# Patient Record
Sex: MALE | Race: WHITE | NOT HISPANIC OR LATINO | Employment: FULL TIME | URBAN - NONMETROPOLITAN AREA
[De-identification: names, ages, dates, MRNs, and addresses within clinical notes are randomized per-mention and may not be internally consistent; named-entity substitution may affect disease eponyms.]

---

## 2017-03-31 ENCOUNTER — HOSPITAL ENCOUNTER (OUTPATIENT)
Facility: HOSPITAL | Age: 62
Setting detail: OBSERVATION
Discharge: HOME | End: 2017-04-01
Attending: HOSPITALIST | Admitting: HOSPITALIST

## 2017-03-31 ENCOUNTER — APPOINTMENT (OUTPATIENT)
Dept: GENERAL RADIOLOGY | Facility: HOSPITAL | Age: 62
End: 2017-03-31

## 2017-03-31 DIAGNOSIS — T78.3XXA ALLERGIC ANGIOEDEMA, INITIAL ENCOUNTER: Primary | ICD-10-CM

## 2017-03-31 LAB
ALBUMIN SERPL-MCNC: 4.2 G/DL (ref 3.4–4.8)
ALBUMIN/GLOB SERPL: 1.4 G/DL (ref 1.1–1.8)
ALP SERPL-CCNC: 74 U/L (ref 38–126)
ALT SERPL W P-5'-P-CCNC: 40 U/L (ref 21–72)
ANION GAP SERPL CALCULATED.3IONS-SCNC: 14 MMOL/L (ref 5–15)
AST SERPL-CCNC: 36 U/L (ref 17–59)
BILIRUB SERPL-MCNC: 0.7 MG/DL (ref 0.2–1.3)
BUN BLD-MCNC: 21 MG/DL (ref 7–21)
BUN/CREAT SERPL: 23.9 (ref 7–25)
CALCIUM SPEC-SCNC: 9.5 MG/DL (ref 8.4–10.2)
CHLORIDE SERPL-SCNC: 105 MMOL/L (ref 95–110)
CO2 SERPL-SCNC: 21 MMOL/L (ref 22–31)
CREAT BLD-MCNC: 0.88 MG/DL (ref 0.7–1.3)
DEPRECATED RDW RBC AUTO: 41.3 FL (ref 35.1–43.9)
ERYTHROCYTE [DISTWIDTH] IN BLOOD BY AUTOMATED COUNT: 12.5 % (ref 11.5–14.5)
GFR SERPL CREATININE-BSD FRML MDRD: 89 ML/MIN/1.73 (ref 56–130)
GLOBULIN UR ELPH-MCNC: 3.1 GM/DL (ref 2.3–3.5)
GLUCOSE BLD-MCNC: 166 MG/DL (ref 60–100)
HCT VFR BLD AUTO: 36.2 % (ref 39–49)
HGB BLD-MCNC: 12.9 G/DL (ref 13.7–17.3)
MCH RBC QN AUTO: 32.3 PG (ref 26.5–34)
MCHC RBC AUTO-ENTMCNC: 35.6 G/DL (ref 31.5–36.3)
MCV RBC AUTO: 90.5 FL (ref 80–98)
PLATELET # BLD AUTO: 400 10*3/MM3 (ref 150–450)
PMV BLD AUTO: 8.7 FL (ref 8–12)
POTASSIUM BLD-SCNC: 4.1 MMOL/L (ref 3.5–5.1)
PROT SERPL-MCNC: 7.3 G/DL (ref 6.3–8.6)
RBC # BLD AUTO: 4 10*6/MM3 (ref 4.37–5.74)
SODIUM BLD-SCNC: 140 MMOL/L (ref 137–145)
WBC NRBC COR # BLD: 10.13 10*3/MM3 (ref 3.2–9.8)

## 2017-03-31 PROCEDURE — 25010000002 DIPHENHYDRAMINE PER 50 MG: Performed by: EMERGENCY MEDICINE

## 2017-03-31 PROCEDURE — 25010000002 EPINEPHRINE PER 0.1 MG: Performed by: EMERGENCY MEDICINE

## 2017-03-31 PROCEDURE — 80053 COMPREHEN METABOLIC PANEL: CPT | Performed by: HOSPITALIST

## 2017-03-31 PROCEDURE — G0378 HOSPITAL OBSERVATION PER HR: HCPCS

## 2017-03-31 PROCEDURE — 25010000002 METHYLPREDNISOLONE PER 125 MG: Performed by: HOSPITALIST

## 2017-03-31 PROCEDURE — 85027 COMPLETE CBC AUTOMATED: CPT | Performed by: HOSPITALIST

## 2017-03-31 PROCEDURE — 25010000002 METHYLPREDNISOLONE PER 125 MG: Performed by: EMERGENCY MEDICINE

## 2017-03-31 PROCEDURE — 96376 TX/PRO/DX INJ SAME DRUG ADON: CPT

## 2017-03-31 PROCEDURE — 96372 THER/PROPH/DIAG INJ SC/IM: CPT

## 2017-03-31 PROCEDURE — 25010000002 DIPHENHYDRAMINE PER 50 MG: Performed by: PHYSICIAN ASSISTANT

## 2017-03-31 PROCEDURE — 71020 HC CHEST PA AND LATERAL: CPT

## 2017-03-31 PROCEDURE — 99284 EMERGENCY DEPT VISIT MOD MDM: CPT

## 2017-03-31 PROCEDURE — 96374 THER/PROPH/DIAG INJ IV PUSH: CPT

## 2017-03-31 RX ORDER — TRAMADOL HYDROCHLORIDE 50 MG/1
50 TABLET ORAL EVERY 6 HOURS PRN
Status: DISCONTINUED | OUTPATIENT
Start: 2017-03-31 | End: 2017-04-01 | Stop reason: HOSPADM

## 2017-03-31 RX ORDER — CLOPIDOGREL BISULFATE 75 MG/1
75 TABLET ORAL DAILY
COMMUNITY
Start: 2016-05-25 | End: 2017-04-01 | Stop reason: HOSPADM

## 2017-03-31 RX ORDER — DIAZEPAM 5 MG/1
5 TABLET ORAL 2 TIMES DAILY PRN
COMMUNITY
End: 2018-08-07

## 2017-03-31 RX ORDER — SODIUM CHLORIDE 0.9 % (FLUSH) 0.9 %
1-10 SYRINGE (ML) INJECTION AS NEEDED
Status: DISCONTINUED | OUTPATIENT
Start: 2017-03-31 | End: 2017-04-01 | Stop reason: HOSPADM

## 2017-03-31 RX ORDER — PRAVASTATIN SODIUM 40 MG
40 TABLET ORAL DAILY
COMMUNITY
End: 2018-08-07

## 2017-03-31 RX ORDER — PRAVASTATIN SODIUM 40 MG
40 TABLET ORAL NIGHTLY
Status: DISCONTINUED | OUTPATIENT
Start: 2017-03-31 | End: 2017-04-01 | Stop reason: HOSPADM

## 2017-03-31 RX ORDER — CLOPIDOGREL BISULFATE 75 MG/1
75 TABLET ORAL DAILY
Status: DISCONTINUED | OUTPATIENT
Start: 2017-03-31 | End: 2017-04-01 | Stop reason: HOSPADM

## 2017-03-31 RX ORDER — MORPHINE SULFATE 2 MG/ML
1 INJECTION, SOLUTION INTRAMUSCULAR; INTRAVENOUS EVERY 4 HOURS PRN
Status: DISCONTINUED | OUTPATIENT
Start: 2017-03-31 | End: 2017-03-31

## 2017-03-31 RX ORDER — CETIRIZINE HYDROCHLORIDE 10 MG/1
10 TABLET ORAL DAILY
Status: DISCONTINUED | OUTPATIENT
Start: 2017-03-31 | End: 2017-04-01 | Stop reason: HOSPADM

## 2017-03-31 RX ORDER — DIPHENHYDRAMINE HYDROCHLORIDE 50 MG/ML
50 INJECTION INTRAMUSCULAR; INTRAVENOUS ONCE
Status: COMPLETED | OUTPATIENT
Start: 2017-03-31 | End: 2017-03-31

## 2017-03-31 RX ORDER — METHYLPREDNISOLONE SODIUM SUCCINATE 125 MG/2ML
125 INJECTION, POWDER, LYOPHILIZED, FOR SOLUTION INTRAMUSCULAR; INTRAVENOUS ONCE
Status: COMPLETED | OUTPATIENT
Start: 2017-03-31 | End: 2017-03-31

## 2017-03-31 RX ORDER — METHYLPREDNISOLONE SODIUM SUCCINATE 125 MG/2ML
80 INJECTION, POWDER, LYOPHILIZED, FOR SOLUTION INTRAMUSCULAR; INTRAVENOUS EVERY 8 HOURS
Status: DISCONTINUED | OUTPATIENT
Start: 2017-03-31 | End: 2017-04-01 | Stop reason: HOSPADM

## 2017-03-31 RX ORDER — LISINOPRIL 5 MG/1
5 TABLET ORAL DAILY
COMMUNITY
End: 2017-04-01 | Stop reason: HOSPADM

## 2017-03-31 RX ORDER — CLOPIDOGREL BISULFATE 75 MG/1
75 TABLET ORAL NIGHTLY
COMMUNITY

## 2017-03-31 RX ORDER — ROPINIROLE 1 MG/1
1 TABLET, FILM COATED ORAL DAILY
COMMUNITY
Start: 2017-02-07 | End: 2017-08-14

## 2017-03-31 RX ORDER — DIPHENHYDRAMINE HYDROCHLORIDE 50 MG/ML
25 INJECTION INTRAMUSCULAR; INTRAVENOUS EVERY 6 HOURS PRN
Status: DISCONTINUED | OUTPATIENT
Start: 2017-03-31 | End: 2017-04-01 | Stop reason: HOSPADM

## 2017-03-31 RX ORDER — PRAVASTATIN SODIUM 40 MG
40 TABLET ORAL DAILY
COMMUNITY
Start: 2016-06-08 | End: 2017-04-01 | Stop reason: HOSPADM

## 2017-03-31 RX ORDER — LORAZEPAM 2 MG/ML
0.5 INJECTION INTRAMUSCULAR EVERY 6 HOURS PRN
Status: DISCONTINUED | OUTPATIENT
Start: 2017-03-31 | End: 2017-04-01 | Stop reason: HOSPADM

## 2017-03-31 RX ORDER — TRAMADOL HYDROCHLORIDE 50 MG/1
50 TABLET ORAL EVERY 4 HOURS
COMMUNITY
Start: 2017-03-21 | End: 2017-04-01 | Stop reason: HOSPADM

## 2017-03-31 RX ORDER — SODIUM CHLORIDE 9 MG/ML
75 INJECTION, SOLUTION INTRAVENOUS CONTINUOUS
Status: DISCONTINUED | OUTPATIENT
Start: 2017-03-31 | End: 2017-04-01 | Stop reason: HOSPADM

## 2017-03-31 RX ORDER — FAMOTIDINE 40 MG/1
40 TABLET, FILM COATED ORAL DAILY
Status: DISCONTINUED | OUTPATIENT
Start: 2017-03-31 | End: 2017-04-01 | Stop reason: HOSPADM

## 2017-03-31 RX ORDER — RANITIDINE 150 MG/1
150 TABLET ORAL 2 TIMES DAILY
COMMUNITY
Start: 2016-08-25 | End: 2017-04-01 | Stop reason: HOSPADM

## 2017-03-31 RX ORDER — LISINOPRIL AND HYDROCHLOROTHIAZIDE 12.5; 1 MG/1; MG/1
1 TABLET ORAL
COMMUNITY
Start: 2017-01-24 | End: 2017-04-01 | Stop reason: HOSPADM

## 2017-03-31 RX ORDER — NALOXONE HCL 0.4 MG/ML
0.4 VIAL (ML) INJECTION
Status: DISCONTINUED | OUTPATIENT
Start: 2017-03-31 | End: 2017-03-31

## 2017-03-31 RX ORDER — TRAMADOL HYDROCHLORIDE 50 MG/1
50 TABLET ORAL EVERY 6 HOURS PRN
COMMUNITY
End: 2018-08-07

## 2017-03-31 RX ORDER — NALOXONE HYDROCHLORIDE 0.4 MG/ML
0.4 INJECTION, SOLUTION INTRAMUSCULAR; INTRAVENOUS; SUBCUTANEOUS
Status: DISCONTINUED | OUTPATIENT
Start: 2017-03-31 | End: 2017-03-31

## 2017-03-31 RX ORDER — RANITIDINE 150 MG/1
150 TABLET ORAL 2 TIMES DAILY
COMMUNITY

## 2017-03-31 RX ADMIN — CLOPIDOGREL BISULFATE 75 MG: 75 TABLET ORAL at 09:15

## 2017-03-31 RX ADMIN — SODIUM CHLORIDE 75 ML/HR: 9 INJECTION, SOLUTION INTRAVENOUS at 09:16

## 2017-03-31 RX ADMIN — DIPHENHYDRAMINE HYDROCHLORIDE 50 MG: 50 INJECTION INTRAMUSCULAR; INTRAVENOUS at 04:23

## 2017-03-31 RX ADMIN — PRAVASTATIN SODIUM 40 MG: 40 TABLET ORAL at 20:28

## 2017-03-31 RX ADMIN — METHYLPREDNISOLONE SODIUM SUCCINATE 80 MG: 125 INJECTION, POWDER, FOR SOLUTION INTRAMUSCULAR; INTRAVENOUS at 09:15

## 2017-03-31 RX ADMIN — CETIRIZINE HYDROCHLORIDE 10 MG: 10 TABLET, FILM COATED ORAL at 15:01

## 2017-03-31 RX ADMIN — METHYLPREDNISOLONE SODIUM SUCCINATE 125 MG: 125 INJECTION, POWDER, FOR SOLUTION INTRAMUSCULAR; INTRAVENOUS at 04:23

## 2017-03-31 RX ADMIN — TRAMADOL HYDROCHLORIDE 50 MG: 50 TABLET, FILM COATED ORAL at 20:28

## 2017-03-31 RX ADMIN — FAMOTIDINE 40 MG: 40 TABLET ORAL at 04:36

## 2017-03-31 RX ADMIN — METHYLPREDNISOLONE SODIUM SUCCINATE 80 MG: 125 INJECTION, POWDER, FOR SOLUTION INTRAMUSCULAR; INTRAVENOUS at 23:37

## 2017-03-31 RX ADMIN — DIPHENHYDRAMINE HYDROCHLORIDE 25 MG: 50 INJECTION INTRAMUSCULAR; INTRAVENOUS at 12:55

## 2017-03-31 RX ADMIN — TRAMADOL HYDROCHLORIDE 50 MG: 50 TABLET, FILM COATED ORAL at 14:33

## 2017-03-31 RX ADMIN — EPINEPHRINE 0.3 MG: 1 INJECTION, SOLUTION INTRAMUSCULAR; SUBCUTANEOUS at 05:14

## 2017-03-31 RX ADMIN — EPINEPHRINE 0.3 MG: 1 INJECTION, SOLUTION INTRAMUSCULAR; SUBCUTANEOUS at 04:28

## 2017-03-31 RX ADMIN — METHYLPREDNISOLONE SODIUM SUCCINATE 80 MG: 125 INJECTION, POWDER, FOR SOLUTION INTRAMUSCULAR; INTRAVENOUS at 16:54

## 2017-03-31 RX ADMIN — FAMOTIDINE 40 MG: 40 TABLET ORAL at 08:27

## 2017-04-01 VITALS
RESPIRATION RATE: 18 BRPM | OXYGEN SATURATION: 98 % | WEIGHT: 195.19 LBS | TEMPERATURE: 96.1 F | SYSTOLIC BLOOD PRESSURE: 134 MMHG | HEIGHT: 76 IN | HEART RATE: 93 BPM | DIASTOLIC BLOOD PRESSURE: 76 MMHG | BODY MASS INDEX: 23.77 KG/M2

## 2017-04-01 LAB
ANION GAP SERPL CALCULATED.3IONS-SCNC: 11 MMOL/L (ref 5–15)
BUN BLD-MCNC: 23 MG/DL (ref 7–21)
BUN/CREAT SERPL: 28.8 (ref 7–25)
CALCIUM SPEC-SCNC: 9.2 MG/DL (ref 8.4–10.2)
CHLORIDE SERPL-SCNC: 103 MMOL/L (ref 95–110)
CO2 SERPL-SCNC: 24 MMOL/L (ref 22–31)
CREAT BLD-MCNC: 0.8 MG/DL (ref 0.7–1.3)
DEPRECATED RDW RBC AUTO: 42.2 FL (ref 35.1–43.9)
ERYTHROCYTE [DISTWIDTH] IN BLOOD BY AUTOMATED COUNT: 12.6 % (ref 11.5–14.5)
GFR SERPL CREATININE-BSD FRML MDRD: 99 ML/MIN/1.73 (ref 60–130)
GLUCOSE BLD-MCNC: 126 MG/DL (ref 60–100)
HCT VFR BLD AUTO: 36.2 % (ref 39–49)
HGB BLD-MCNC: 12.9 G/DL (ref 13.7–17.3)
MCH RBC QN AUTO: 32.5 PG (ref 26.5–34)
MCHC RBC AUTO-ENTMCNC: 35.6 G/DL (ref 31.5–36.3)
MCV RBC AUTO: 91.2 FL (ref 80–98)
PLATELET # BLD AUTO: 406 10*3/MM3 (ref 150–450)
PMV BLD AUTO: 9.3 FL (ref 8–12)
POTASSIUM BLD-SCNC: 4.2 MMOL/L (ref 3.5–5.1)
RBC # BLD AUTO: 3.97 10*6/MM3 (ref 4.37–5.74)
SODIUM BLD-SCNC: 138 MMOL/L (ref 137–145)
WBC NRBC COR # BLD: 20.12 10*3/MM3 (ref 3.2–9.8)

## 2017-04-01 PROCEDURE — G0378 HOSPITAL OBSERVATION PER HR: HCPCS

## 2017-04-01 PROCEDURE — 25010000002 METHYLPREDNISOLONE PER 125 MG: Performed by: HOSPITALIST

## 2017-04-01 PROCEDURE — 96376 TX/PRO/DX INJ SAME DRUG ADON: CPT

## 2017-04-01 PROCEDURE — 85027 COMPLETE CBC AUTOMATED: CPT | Performed by: NURSE PRACTITIONER

## 2017-04-01 PROCEDURE — 80048 BASIC METABOLIC PNL TOTAL CA: CPT | Performed by: NURSE PRACTITIONER

## 2017-04-01 RX ORDER — METHYLPREDNISOLONE 4 MG/1
TABLET ORAL
Qty: 21 TABLET | Refills: 0 | Status: SHIPPED | OUTPATIENT
Start: 2017-04-01 | End: 2017-06-14 | Stop reason: ALTCHOICE

## 2017-04-01 RX ORDER — EPINEPHRINE 0.3 MG/.3ML
0.3 INJECTION SUBCUTANEOUS AS NEEDED
Qty: 1 EACH | Refills: 1 | Status: SHIPPED | OUTPATIENT
Start: 2017-04-01

## 2017-04-01 RX ADMIN — TRAMADOL HYDROCHLORIDE 50 MG: 50 TABLET, FILM COATED ORAL at 08:14

## 2017-04-01 RX ADMIN — CLOPIDOGREL BISULFATE 75 MG: 75 TABLET ORAL at 08:08

## 2017-04-01 RX ADMIN — CETIRIZINE HYDROCHLORIDE 10 MG: 10 TABLET, FILM COATED ORAL at 08:08

## 2017-04-01 RX ADMIN — FAMOTIDINE 40 MG: 40 TABLET ORAL at 08:08

## 2017-04-01 RX ADMIN — METHYLPREDNISOLONE SODIUM SUCCINATE 80 MG: 125 INJECTION, POWDER, FOR SOLUTION INTRAMUSCULAR; INTRAVENOUS at 08:09

## 2017-04-01 NOTE — DISCHARGE SUMMARY
[facility] Medicine Services  DISCHARGE SUMMARY       Date of Admission: 3/31/2017  Date of Discharge:  4/1/2017  Primary Care Physician: Yasmani Alberto MD    Presenting Problem/History of Present Illness:  Allergic angioedema, initial encounter [T78.3XXA]       Final Discharge Diagnoses:  Hospital Problem List     Allergic angioedema          Consults:   Consults     Date and Time Order Name Status Description    3/31/2017 0527 Hospitalist (on-call MD unless specified)            Procedures Performed:                 Pertinent Test Results:   Lab Results (last 24 hours)     Procedure Component Value Units Date/Time    CBC (No Diff) [[id number]] Collected:  04/01/17 1008    Specimen:  Blood Updated:  04/01/17 1050    Basic Metabolic Panel [[id number]]  (Abnormal) Collected:  04/01/17 1008    Specimen:  Blood Updated:  04/01/17 1110     Glucose 126 (H) mg/dL      BUN 23 (H) mg/dL      Creatinine 0.80 mg/dL      Sodium 138 mmol/L      Potassium 4.2 mmol/L      Chloride 103 mmol/L      CO2 24.0 mmol/L      Calcium 9.2 mg/dL      eGFR Non African Amer 99 mL/min/1.73      BUN/Creatinine Ratio 28.8 (H)     Anion Gap 11.0 mmol/L         Imaging Results (last 24 hours)     Procedure Component Value Units Date/Time    XR Chest PA & Lateral [[id number]] Collected:  03/31/17 1446     Updated:  03/31/17 1456    Narrative:         [facility] [place]    Patient Name: KRISTOPHER SMITH    ORDERING: SHEILA HAHN     ATTENDING: ZEUS WELLS     REFERRING: SHEILA HAHN    -----------------------    PROCEDURE: Two-view chest    COMPARISON: 7/8/2013    HISTORY: Angioedema, survey airway, T78.3XXA Angioneurotic edema,  initial encounter    FINDINGS: Frontal and lateral views of the chest are obtained.     Devices: None    Lungs/Pleura: The lungs appear hyperinflated and there are coarse  interstitial markings likely due to COPD. Calcified nodule in the  right lung base.  The lungs  "are otherwise clear.    Cardiomediastinal structures: Normal         Impression:       CONCLUSION:    No acute cardiopulmonary disease    Electronically signed by:  Drew Darnell MD  3/31/2017 2:54 PM CDT  Workstation: [id number]            Chief Complaint on Day of Discharge: none    Hospital Course:  61-year-old  male who presented to the emergency department on March 31, 2017 with complaints of shortness of breath and facial swelling.  The patient reports that the shortness of breath and left-sided facial swelling started roughly 2 hours prior to his arrival in the ER.  Upon initial assessment it was found that patient had angioedema related to use of lisinopril.  He was treated during the hospitalization with IV steroids, Benadryl, and epinephrine.      During the hospitalization the patient's lisinopril was held and IV steroids were continued.  His facial swelling is greatly improved.  The patient has no airway compromise and he is able to perform all activities without any distress.  The patient will be given prescription at discharge for EpiPen and Medrol dose pack.  He'll be discharged home today in stable condition with instructions to follow-up with his primary care provider within a week of discharge in order to further monitor his hypertension as the lisinopril has been discontinued.    Condition on Discharge:  stable    Physical Exam on Discharge:  /76 (BP Location: Right arm, Patient Position: Sitting)  Pulse 93  Temp 96.1 °F (35.6 °C) (Oral)   Resp 18  Ht 76\" (193 cm)  Wt 195 lb 3 oz (88.5 kg)  SpO2 98%  BMI 23.76 kg/m2  Physical Exam   Constitutional: He is oriented to person, place, and time. He appears well-developed and well-nourished.   HENT:   Head: Normocephalic.   Eyes: Conjunctivae are normal.   Neck: Neck supple.   Cardiovascular: Normal rate, regular rhythm, normal heart sounds and intact distal pulses.  Exam reveals no gallop and no friction rub.    No murmur " heard.  Pulmonary/Chest: Effort normal and breath sounds normal. No respiratory distress. He has no wheezes. He has no rales. He exhibits no tenderness.   Abdominal: Soft. Bowel sounds are normal. He exhibits no distension. There is no tenderness.   Musculoskeletal: Normal range of motion.   Neurological: He is alert and oriented to person, place, and time.   Skin: Skin is warm and dry.   Psychiatric: He has a normal mood and affect. His behavior is normal.   Vitals reviewed.        Discharge Disposition:  Home or Self Care    Discharge Medications:   Heber Houston   Home Medication Instructions GILBERT:[id number]    Printed on:04/01/17 6326   Medication Information                      clopidogrel (PLAVIX) 75 MG tablet  Take 75 mg by mouth Daily.             diazePAM (VALIUM) 5 MG tablet  Take 5 mg by mouth 2 (Two) Times a Day As Needed for Anxiety.             MethylPREDNISolone (MEDROL, ELIDIA,) 4 MG tablet  Take as directed on package instructions.             pravastatin (PRAVACHOL) 40 MG tablet  Take 40 mg by mouth Daily.             raNITIdine (ZANTAC) 150 MG tablet  Take 150 mg by mouth 2 (Two) Times a Day.             rOPINIRole (REQUIP) 1 MG tablet  Take 1 mg by mouth Daily.             traMADol (ULTRAM) 50 MG tablet  Take 50 mg by mouth Every 6 (Six) Hours As Needed for Moderate Pain (4-6).                 Discharge Diet:   Diet Instructions     Diet: Cardiac; Thin Liquids, No Restrictions       Discharge Diet:  Cardiac   Fluid Consistency:  Thin Liquids, No Restrictions                 Activity at Discharge:   Activity Instructions     Activity as Tolerated                     Discharge Care Plan/Instructions: Follow up with PCP within one week of discharge.    Follow-up Appointments:   No future appointments.    Test Results Pending at Discharge:    Order Current Status    CBC (No Diff) In process          DANAE Cline  04/01/17  11:50 AM

## 2017-04-01 NOTE — PLAN OF CARE
Problem: Patient Care Overview (Adult)  Goal: Plan of Care Review  Outcome: Ongoing (interventions implemented as appropriate)    04/01/17 0525   Coping/Psychosocial Response Interventions   Plan Of Care Reviewed With patient   Patient Care Overview   Progress no change         Problem: Pain, Chronic (Adult)  Goal: Acceptable Pain Control/Comfort Level  Outcome: Ongoing (interventions implemented as appropriate)    Problem: Anaphylactic/Systemic Hypersensitivity Reaction (Adult)  Goal: Signs and Symptoms of Listed Potential Problems Will be Absent or Manageable (Anaphylactic/Systemic Hypersensitivity Reaction)  Outcome: Ongoing (interventions implemented as appropriate)

## 2017-06-14 RX ORDER — AMLODIPINE BESYLATE 5 MG/1
5 TABLET ORAL NIGHTLY
COMMUNITY

## 2017-06-19 ENCOUNTER — ANESTHESIA (OUTPATIENT)
Dept: GASTROENTEROLOGY | Facility: HOSPITAL | Age: 62
End: 2017-06-19

## 2017-06-19 ENCOUNTER — HOSPITAL ENCOUNTER (OUTPATIENT)
Facility: HOSPITAL | Age: 62
Setting detail: HOSPITAL OUTPATIENT SURGERY
Discharge: HOME | End: 2017-06-19
Attending: INTERNAL MEDICINE | Admitting: INTERNAL MEDICINE

## 2017-06-19 ENCOUNTER — ANESTHESIA EVENT (OUTPATIENT)
Dept: GASTROENTEROLOGY | Facility: HOSPITAL | Age: 62
End: 2017-06-19

## 2017-06-19 VITALS
SYSTOLIC BLOOD PRESSURE: 140 MMHG | OXYGEN SATURATION: 97 % | RESPIRATION RATE: 16 BRPM | BODY MASS INDEX: 23.99 KG/M2 | HEIGHT: 76 IN | TEMPERATURE: 97.4 F | WEIGHT: 197 LBS | HEART RATE: 66 BPM | DIASTOLIC BLOOD PRESSURE: 77 MMHG

## 2017-06-19 DIAGNOSIS — K21.9 GASTROESOPHAGEAL REFLUX DISEASE WITH STRICTURE: ICD-10-CM

## 2017-06-19 DIAGNOSIS — R10.11 ABDOMINAL PAIN, RUQ (RIGHT UPPER QUADRANT): ICD-10-CM

## 2017-06-19 DIAGNOSIS — K22.2 GASTROESOPHAGEAL REFLUX DISEASE WITH STRICTURE: ICD-10-CM

## 2017-06-19 PROCEDURE — 88305 TISSUE EXAM BY PATHOLOGIST: CPT | Performed by: INTERNAL MEDICINE

## 2017-06-19 PROCEDURE — 25010000002 PROPOFOL 10 MG/ML EMULSION: Performed by: NURSE ANESTHETIST, CERTIFIED REGISTERED

## 2017-06-19 PROCEDURE — 88305 TISSUE EXAM BY PATHOLOGIST: CPT | Performed by: PATHOLOGY

## 2017-06-19 RX ORDER — LIDOCAINE HYDROCHLORIDE 10 MG/ML
INJECTION, SOLUTION INFILTRATION; PERINEURAL AS NEEDED
Status: DISCONTINUED | OUTPATIENT
Start: 2017-06-19 | End: 2017-06-19 | Stop reason: SURG

## 2017-06-19 RX ORDER — PROPOFOL 10 MG/ML
VIAL (ML) INTRAVENOUS AS NEEDED
Status: DISCONTINUED | OUTPATIENT
Start: 2017-06-19 | End: 2017-06-19 | Stop reason: SURG

## 2017-06-19 RX ORDER — DEXTROSE AND SODIUM CHLORIDE 5; .45 G/100ML; G/100ML
20 INJECTION, SOLUTION INTRAVENOUS CONTINUOUS
Status: DISCONTINUED | OUTPATIENT
Start: 2017-06-19 | End: 2017-06-19 | Stop reason: HOSPADM

## 2017-06-19 RX ADMIN — PROPOFOL 30 MG: 10 INJECTION, EMULSION INTRAVENOUS at 17:15

## 2017-06-19 RX ADMIN — PROPOFOL 60 MG: 10 INJECTION, EMULSION INTRAVENOUS at 17:11

## 2017-06-19 RX ADMIN — LIDOCAINE HYDROCHLORIDE 50 MG: 10 INJECTION, SOLUTION INFILTRATION; PERINEURAL at 17:07

## 2017-06-19 RX ADMIN — PROPOFOL 80 MG: 10 INJECTION, EMULSION INTRAVENOUS at 17:08

## 2017-06-19 RX ADMIN — DEXTROSE AND SODIUM CHLORIDE 20 ML/HR: 5; 450 INJECTION, SOLUTION INTRAVENOUS at 16:11

## 2017-06-19 RX ADMIN — LIDOCAINE HYDROCHLORIDE 50 MG: 10 INJECTION, SOLUTION INFILTRATION; PERINEURAL at 16:59

## 2017-06-19 RX ADMIN — PROPOFOL 80 MG: 10 INJECTION, EMULSION INTRAVENOUS at 16:59

## 2017-06-19 NOTE — ANESTHESIA PROCEDURE NOTES
Peripheral IV    Patient location during procedure: OR  Line placed for Fluids/Medication Admin.  Performed By   CRNA: MADIE ARMENDARIZ  Preanesthetic Checklist  Completed: patient identified, site marked, surgical consent, pre-op evaluation, timeout performed, IV checked, risks and benefits discussed and monitors and equipment checked  Peripheral IV Prep   Patient position: supine   Prep: ChloraPrep  Patient monitoring: continuous pulse ox and cardiac monitor  Peripheral IV Procedure   Laterality:left  Location:  Forearm  Catheter size: 20 G         Post Assessment   Dressing Type: transparent and tape.    IV Dressing/Site: clean, dry and intact

## 2017-06-19 NOTE — PLAN OF CARE
Problem: GI Endoscopy (Adult)  Goal: Signs and Symptoms of Listed Potential Problems Will be Absent or Manageable (GI Endoscopy)  Outcome: Outcome(s) achieved Date Met:  06/19/17 06/19/17 1721   GI Endoscopy   Problems Assessed (GI Endoscopy) all   Problems Present (GI Endoscopy) none

## 2017-06-19 NOTE — H&P
Phillip Allison DO,[facility]  Gastroenterology  Hepatology  Endoscopy  Board Certified in Internal Medicine and gastroenterology  [address], [address]  [place]. [address]  T- [phone]   F - [phone]     GASTROENTEROLOGY HISTORY AND PHYSICAL  NOTE   PHILLIP ALLISON DO.         SUBJECTIVE:   6/19/2017    Name: Heber Houston  DOD: 1955      Chief Complaint:       Subjective : Dysphagia, weight loss of 5 pounds     Patient is 61 y.o. male presents with desire for elective EGD and dilation of the esophagus..      ROS/HISTORY/ CURRENT MEDICATIONS/OBJECTIVE/VS/PE:   Review of Systems:   Review of Systems    History:     Past Medical History:   Diagnosis Date   • Hypertension    • Stroke 2012    tia     Past Surgical History:   Procedure Laterality Date   • CAROTID ARTERY ANGIOPLASTY Left 2012   • CHOLECYSTECTOMY       History reviewed. No pertinent family history.  Social History   Substance Use Topics   • Smoking status: Current Every Day Smoker     Packs/day: 1.00     Types: Cigarettes   • Smokeless tobacco: None   • Alcohol use 3.0 oz/week     5 Cans of beer per week      Comment: 6 PK/WK     Prescriptions Prior to Admission   Medication Sig Dispense Refill Last Dose   • amLODIPine (NORVASC) 5 MG tablet Take 5 mg by mouth Daily.   6/18/2017 at Unknown time   • rOPINIRole (REQUIP) 1 MG tablet Take 1 mg by mouth Daily.   6/18/2017 at Unknown time   • traMADol (ULTRAM) 50 MG tablet Take 50 mg by mouth Every 6 (Six) Hours As Needed for Moderate Pain (4-6).   6/19/2017 at Unknown time   • clopidogrel (PLAVIX) 75 MG tablet Take 75 mg by mouth Daily.   6/15/2017   • diazePAM (VALIUM) 5 MG tablet Take 5 mg by mouth 2 (Two) Times a Day As Needed for Anxiety.   6/16/2017   • EPINEPHrine (EPIPEN 2-ELIDIA) 0.3 MG/0.3ML solution auto-injector injection Inject 0.3 mL under the skin As Needed (anaphylaxis). 1 each 1    • pravastatin (PRAVACHOL) 40 MG tablet Take 40 mg by mouth Daily.   6/16/2017   •  raNITIdine (ZANTAC) 150 MG tablet Take 150 mg by mouth 2 (Two) Times a Day.   More than a month at Unknown time     Allergies:  Lisinopril and Lisinopril-hydrochlorothiazide    I have reviewed the patients medical history, surgical history and family history in the available medical record system.     Current Medications:     Current Facility-Administered Medications   Medication Dose Route Frequency Provider Last Rate Last Dose   • dextrose 5 % and sodium chloride 0.45 % infusion  20 mL/hr Intravenous Continuous Sharan Gupta, DO 20 mL/hr at 06/19/17 1611 20 mL/hr at 06/19/17 1611       Objective     Physical Exam:   Temp:  [97.5 °F (36.4 °C)] 97.5 °F (36.4 °C)  Heart Rate:  [68] 68  Resp:  [18] 18  BP: (137)/(70) 137/70    Physical Exam:  General Appearance:    Alert, cooperative, in no acute distress   Head:    Normocephalic, without obvious abnormality, atraumatic   Eyes:            Lids and lashes normal, conjunctivae and sclerae normal, no   icterus, no pallor, corneas clear, PERRLA   Ears:    Ears appear intact with no abnormalities noted   Throat:   No oral lesions, no thrush, oral mucosa moist   Neck:   No adenopathy, supple, trachea midline, no thyromegaly, no     carotid bruit, no JVD   Back:     No kyphosis present, no scoliosis present, no skin lesions,       erythema or scars, no tenderness to percussion or                   palpation,   range of motion normal   Lungs:     Clear to auscultation,respirations regular, even and                   unlabored    Heart:    Regular rhythm and normal rate, normal S1 and S2, no            murmur, no gallop, no rub, no click   Breast Exam:    Deferred   Abdomen:     Normal bowel sounds, no masses, no organomegaly, soft        non-tender, non-distended, no guarding, no rebound                 tenderness   Genitalia:    Deferred   Extremities:   Moves all extremities well, no edema, no cyanosis, no              redness   Pulses:   Pulses palpable and equal  bilaterally   Skin:   No bleeding, bruising or rash   Lymph nodes:   No palpable adenopathy   Neurologic:   Cranial nerves 2 - 12 grossly intact, sensation intact, DTR        present and equal bilaterally      Results Review:     Lab Results   Component Value Date    WBC 20.12 (H) 04/01/2017    WBC 10.13 (H) 03/31/2017    HGB 12.9 (L) 04/01/2017    HGB 12.9 (L) 03/31/2017    HCT 36.2 (L) 04/01/2017    HCT 36.2 (L) 03/31/2017     04/01/2017     03/31/2017             No results found for: LIPASE  No results found for: INR       Radiology Review:  Imaging Results (last 72 hours)     ** No results found for the last 72 hours. **           I reviewed the patient's new clinical results.  I reviewed the patient's new imaging results and agree with the interpretation.     ASSESSMENT/PLAN:   ASSESSMENT:   1.  Recurrent esophageal dysphagia with weight loss.  Exclude neoplasm.  Suspect stricture.    PLAN:   1.  Esophagogastroduodenoscopy with biopsies and dilation of the esophagus    Risk and benefits associated with the procedure are reviewed with the patient.  He wishes to proceed      Sharan Gupta DO  06/19/17  4:49 PM

## 2017-06-19 NOTE — ANESTHESIA POSTPROCEDURE EVALUATION
Patient: Heber Houston    Procedure Summary     Date Anesthesia Start Anesthesia Stop Room / Location    06/19/17 4084 [id number] [facility] ENDOSCOPY 2 / [facility] ENDOSCOPY       Procedure Diagnosis Surgeon Provider    ESOPHAGOGASTRODUODENOSCOPY (N/A Esophagus) Abdominal pain, RUQ (right upper quadrant); Gastroesophageal reflux disease with stricture  (Abdominal pain, RUQ (right upper quadrant) ; Gastroesophageal reflux disease with stricture [) Sharan Gupta, DO Kamini Robison CRNA          Anesthesia Type: MAC  Last vitals  BP      Temp      Pulse     Resp      SpO2        Post Anesthesia Care and Evaluation    Patient location during evaluation: bedside  Patient participation: complete - patient participated  Level of consciousness: awake and awake and alert  Pain score: 0  Pain management: satisfactory to patient  Airway patency: patent  Anesthetic complications: No anesthetic complications  PONV Status: none  Cardiovascular status: acceptable and stable  Respiratory status: acceptable, room air, unassisted and spontaneous ventilation  Hydration status: acceptable

## 2017-06-21 LAB
LAB AP CASE REPORT: NORMAL
Lab: NORMAL
PATH REPORT.FINAL DX SPEC: NORMAL
PATH REPORT.GROSS SPEC: NORMAL

## 2017-07-31 ENCOUNTER — APPOINTMENT (OUTPATIENT)
Dept: LAB | Facility: HOSPITAL | Age: 62
End: 2017-07-31

## 2017-07-31 ENCOUNTER — TELEPHONE (OUTPATIENT)
Dept: FAMILY MEDICINE CLINIC | Facility: CLINIC | Age: 62
End: 2017-07-31

## 2017-07-31 ENCOUNTER — OFFICE VISIT (OUTPATIENT)
Dept: FAMILY MEDICINE CLINIC | Facility: CLINIC | Age: 62
End: 2017-07-31

## 2017-07-31 VITALS
OXYGEN SATURATION: 99 % | WEIGHT: 199 LBS | HEART RATE: 92 BPM | DIASTOLIC BLOOD PRESSURE: 80 MMHG | SYSTOLIC BLOOD PRESSURE: 130 MMHG | HEIGHT: 76 IN | BODY MASS INDEX: 24.23 KG/M2

## 2017-07-31 DIAGNOSIS — G25.81 RESTLESS LEG SYNDROME: ICD-10-CM

## 2017-07-31 DIAGNOSIS — Z11.59 NEED FOR HEPATITIS C SCREENING TEST: ICD-10-CM

## 2017-07-31 DIAGNOSIS — G57.93 NEUROPATHIC PAIN OF BOTH LEGS: Primary | ICD-10-CM

## 2017-07-31 DIAGNOSIS — Z13.220 NEED FOR LIPID SCREENING: ICD-10-CM

## 2017-07-31 LAB
ARTICHOKE IGE QN: 97 MG/DL (ref 1–129)
CHOLEST SERPL-MCNC: 171 MG/DL (ref 0–199)
HBA1C MFR BLD: 5.7 % (ref 4–5.6)
HDLC SERPL-MCNC: 43 MG/DL (ref 60–200)
LDLC/HDLC SERPL: 2.15 {RATIO} (ref 0–3.55)
TRIGL SERPL-MCNC: 177 MG/DL (ref 20–199)

## 2017-07-31 PROCEDURE — 80061 LIPID PANEL: CPT | Performed by: STUDENT IN AN ORGANIZED HEALTH CARE EDUCATION/TRAINING PROGRAM

## 2017-07-31 PROCEDURE — 83036 HEMOGLOBIN GLYCOSYLATED A1C: CPT | Performed by: STUDENT IN AN ORGANIZED HEALTH CARE EDUCATION/TRAINING PROGRAM

## 2017-07-31 PROCEDURE — 86803 HEPATITIS C AB TEST: CPT | Performed by: STUDENT IN AN ORGANIZED HEALTH CARE EDUCATION/TRAINING PROGRAM

## 2017-07-31 PROCEDURE — 36415 COLL VENOUS BLD VENIPUNCTURE: CPT | Performed by: STUDENT IN AN ORGANIZED HEALTH CARE EDUCATION/TRAINING PROGRAM

## 2017-07-31 PROCEDURE — 99203 OFFICE O/P NEW LOW 30 MIN: CPT | Performed by: STUDENT IN AN ORGANIZED HEALTH CARE EDUCATION/TRAINING PROGRAM

## 2017-07-31 RX ORDER — GABAPENTIN 100 MG/1
100 CAPSULE ORAL 3 TIMES DAILY
Qty: 90 CAPSULE | Refills: 2 | Status: SHIPPED | OUTPATIENT
Start: 2017-07-31 | End: 2017-08-14 | Stop reason: SINTOL

## 2017-08-01 PROBLEM — G57.93 NEUROPATHIC PAIN OF BOTH LEGS: Status: ACTIVE | Noted: 2017-08-01

## 2017-08-02 LAB — HCV AB SER DONR QL: NEGATIVE

## 2017-08-14 ENCOUNTER — OFFICE VISIT (OUTPATIENT)
Dept: FAMILY MEDICINE CLINIC | Facility: CLINIC | Age: 62
End: 2017-08-14

## 2017-08-14 VITALS
OXYGEN SATURATION: 98 % | SYSTOLIC BLOOD PRESSURE: 130 MMHG | BODY MASS INDEX: 23.14 KG/M2 | HEART RATE: 90 BPM | WEIGHT: 190 LBS | DIASTOLIC BLOOD PRESSURE: 86 MMHG | HEIGHT: 76 IN

## 2017-08-14 DIAGNOSIS — G57.93 NEUROPATHIC PAIN OF BOTH LEGS: Primary | ICD-10-CM

## 2017-08-14 PROCEDURE — 99213 OFFICE O/P EST LOW 20 MIN: CPT | Performed by: STUDENT IN AN ORGANIZED HEALTH CARE EDUCATION/TRAINING PROGRAM

## 2017-08-14 RX ORDER — ROPINIROLE 0.25 MG/1
0.25 TABLET, FILM COATED ORAL NIGHTLY
Qty: 30 TABLET | Refills: 5 | Status: SHIPPED | OUTPATIENT
Start: 2017-08-14 | End: 2018-08-07

## 2017-08-14 NOTE — PROGRESS NOTES
Chief Complaint   Patient presents with   • Follow-up     HPI       Subjective:    tles  Heber Houston is a 61 y.o. male who presents to the Family Medicine Resident clinic today to follow up regarding leg pain.      Patient stated that at previous visit that he was diagnosed with restless leg syndrome approximately 6 years ago.  He stated at last visit that his symptoms include tightening and cramping in the thighs bilaterally as well as a some tingling and numbness on the back of both thighs down to the bottom of his feet.  He had tried heat wraps, ice, & NSAIDs in the past which relieved some of the discomfort.   Patient was placed on requip & tramadol by previous provider -- patient stated that tramadol was the only medicine that has worked well in the past.  Patient stated that he had followed with a pain clinic in [place], but that it was too far to drive for him to continue to go there for his pain medication.  He is interested in seeing a physician in the pain clinic in [place].    He was started on gabapentin 2 weeks ago. He states that he doesn't like the way it made him feel. When he was operating a vehicle, he found himself drifting in the road and not caring. He has felt excessively tired the past few weeks. He quit taking about a week after starting medication.    It was also discussed at previous visit that patient needed TDAP, Prevnar, & Zoster vaccine. He continues to deny those.     We discussed his recent lab results including a negative Hep C panel, hemoglobin A1C, and lipid panel.                     Preventative:  Over the past 2 weeks, have you felt down, depressed, or hopeless?No   Over the past 2 weeks, have you felt little interest or pleasure in doing things?No  Clinical depression screening refused by patient.No     On osteoporosis therapy?No     Past Medical Hx:  Past Medical History:   Diagnosis Date   • Hypertension    • Stroke 2012    tia       Past Surgical Hx:  Past  Surgical History:   Procedure Laterality Date   • CAROTID ARTERY ANGIOPLASTY Left 2012   • CHOLECYSTECTOMY     • ENDOSCOPY N/A 6/19/2017    Procedure: ESOPHAGOGASTRODUODENOSCOPY;  Surgeon: Sharan Gupta DO;  Location: [facility] ENDOSCOPY;  Service:        Health Maintenance:  Health Maintenance   Topic Date Due   • PNEUMOCOCCAL VACCINE (19-64 MEDIUM RISK) (1 of 1 - PPSV23) 11/22/1976   • TDAP/TD VACCINES (1 - Tdap) 11/22/1976   • INFLUENZA VACCINE  09/01/2017   • COLONOSCOPY  05/31/2024   • HEPATITIS C SCREENING  Completed       Current Meds:    Current Outpatient Prescriptions:   •  EPINEPHrine (EPIPEN 2-ELIDIA) 0.3 MG/0.3ML solution auto-injector injection, Inject 0.3 mL under the skin As Needed (anaphylaxis)., Disp: 1 each, Rfl: 1  •  pravastatin (PRAVACHOL) 40 MG tablet, Take 40 mg by mouth Daily., Disp: , Rfl:   •  raNITIdine (ZANTAC) 150 MG tablet, Take 150 mg by mouth 2 (Two) Times a Day., Disp: , Rfl:   •  rOPINIRole (REQUIP) 1 MG tablet, Take 1 mg by mouth Daily., Disp: , Rfl:   •  amLODIPine (NORVASC) 5 MG tablet, Take 5 mg by mouth Daily., Disp: , Rfl:   •  clopidogrel (PLAVIX) 75 MG tablet, Take 75 mg by mouth Daily., Disp: , Rfl:   •  diazePAM (VALIUM) 5 MG tablet, Take 5 mg by mouth 2 (Two) Times a Day As Needed for Anxiety., Disp: , Rfl:   •  gabapentin (NEURONTIN) 100 MG capsule, Take 1 capsule by mouth 3 (Three) Times a Day., Disp: 90 capsule, Rfl: 2  •  traMADol (ULTRAM) 50 MG tablet, Take 50 mg by mouth Every 6 (Six) Hours As Needed for Moderate Pain (4-6)., Disp: , Rfl:     Allergies:  Lisinopril and Lisinopril-hydrochlorothiazide    Family Hx:  No family history on file.     Social History:  Social History     Social History   • Marital status:      Spouse name: N/A   • Number of children: N/A   • Years of education: N/A     Occupational History   • Not on file.     Social History Main Topics   • Smoking status: Current Every Day Smoker     Packs/day: 1.00     Years: 44.00     Types:  "Cigarettes   • Smokeless tobacco: Never Used   • Alcohol use 3.0 oz/week     5 Cans of beer per week      Comment: 6 PK/WK   • Drug use: No   • Sexual activity: Not Currently     Other Topics Concern   • Not on file     Social History Narrative   • No narrative on file       Review of Systems  General:negative for - chills, fatigue, fever  Ophthalmic: negative for - blurry vision or loss of vision  ENT: negative for - hearing change, nasal congestion or sore throat  Hematological and Lymphatic: negative for - jaundice  Endocrine: negative for - hair pattern changes, skin changes or temperature intolerance  Respiratory: no cough, shortness of breath, or wheezing  Cardiovascular: no chest pain, edema or dyspnea on exertion  Gastrointestinal: no  Nausea/vomiting, abdominal pain  Genito-Urinary: no dysuria, trouble voiding, or hematuria  Musculoskeletal: See HPI  Neurological: negative for - dizziness, headaches, numbness/tingling or seizures  Dermatological: negative for rash and skin lesion changes      Objective:     /86  Pulse 90  Ht 76\" (193 cm)  Wt 190 lb (86.2 kg)  SpO2 98%  BMI 23.13 kg/m2        General Appearance:    Alert, cooperative, no distress, appears stated age   Head:    Normocephalic, without obvious abnormality, atraumatic   Eyes:    conjunctiva/corneas clear, EOM's intact   Ears:    Normal external ear canals, both ears   Nose:   Nares normal, septum midline, no drainage or sinus tenderness   Throat:   Lips, mucosa, and tongue normal; teeth and gums normal   Neck:   Supple, symmetrical, trachea midline, no adenopathy;     thyroid:  no enlargement/tenderness/nodules   Lungs:     Clear to auscultation bilaterally, respirations unlabored   Chest Wall:    No tenderness or deformity    Heart:    Normal s1/s2, regular rate & rhythm, no rubs/gallops/murmurs appreciated on auscultation   Abdomen:     Soft, NT/ND, bowel sounds present x 4   Extremities:   Extremities normal, atraumatic, no " cyanosis or edema. Limited passive and active ROM on left leg. Tenderness to palpation to lower back.    Pulses:   2+ and symmetric all extremities   Skin:   Warm & dry, no lesions or rashes noted   Lymph nodes:   No cervical or supraclavicular LAD noted   Neurologic:   CNII-XII grossly intact              Assessment/Plan:     1. Neuropathic pain of both legs         Follow-up:     Return in about 3 months (around 11/14/2017) for Recheck.     Patient will return in approximately 3 months for a re-check. Patient will be referred to pain management in [place]. Patient was instructed to call or return to clinic if symptoms worsen.  Will refer to neurology. Started Requip 0.25mg nightly.        GOALS:  Control pain in legs at night so patient can sleep well.      Preventative:  Male Preventative: Colon cancer screening is up to date  Vaccines:   Tetanus vaccine: not up to date - will discuss further at next visit; denied at this visit  Annual influenza vaccine: up to date   Pneumococcal vaccine: not up to date - will discuss further at next visit; denied at this visit  Hep B vaccine: unknown   Zoster vaccine:unknown    [facility]: [id number]    Smoking cessation counseling was provided.  Weight goals: plan meals    RISK SCORE: 3         This document has been electronically signed by Mara Landeros MD on August 14, 2017 3:31 PM        Mara Landeros M.D.  PGY1  Family Practice Resident  [address] [place] [address]  Phone: [phone]  Fax: [phone]

## 2017-08-15 NOTE — PROGRESS NOTES
I have reviewed the notes, assessments, and/or procedures performed. I concur with her/his documentation of Heber Houston.     Keith Golden, DO

## 2017-09-25 ENCOUNTER — APPOINTMENT (OUTPATIENT)
Dept: LAB | Facility: HOSPITAL | Age: 62
End: 2017-09-25

## 2017-09-25 ENCOUNTER — OFFICE VISIT (OUTPATIENT)
Dept: PAIN MEDICINE | Facility: CLINIC | Age: 62
End: 2017-09-25

## 2017-09-25 VITALS
WEIGHT: 208.3 LBS | SYSTOLIC BLOOD PRESSURE: 148 MMHG | BODY MASS INDEX: 25.36 KG/M2 | DIASTOLIC BLOOD PRESSURE: 80 MMHG | HEIGHT: 76 IN

## 2017-09-25 DIAGNOSIS — G89.29 CHRONIC LOW BACK PAIN, UNSPECIFIED BACK PAIN LATERALITY, WITH SCIATICA PRESENCE UNSPECIFIED: Primary | ICD-10-CM

## 2017-09-25 DIAGNOSIS — Z79.899 HIGH RISK MEDICATIONS (NOT ANTICOAGULANTS) LONG-TERM USE: ICD-10-CM

## 2017-09-25 DIAGNOSIS — M25.562 CHRONIC PAIN OF LEFT KNEE: ICD-10-CM

## 2017-09-25 DIAGNOSIS — M54.5 CHRONIC LOW BACK PAIN, UNSPECIFIED BACK PAIN LATERALITY, WITH SCIATICA PRESENCE UNSPECIFIED: Primary | ICD-10-CM

## 2017-09-25 DIAGNOSIS — M79.18 MYOFACIAL MUSCLE PAIN: ICD-10-CM

## 2017-09-25 DIAGNOSIS — M47.817 LUMBOSACRAL SPONDYLOSIS WITHOUT MYELOPATHY: ICD-10-CM

## 2017-09-25 DIAGNOSIS — M47.816 LUMBAR FACET ARTHROPATHY: ICD-10-CM

## 2017-09-25 DIAGNOSIS — G89.29 CHRONIC PAIN OF LEFT KNEE: ICD-10-CM

## 2017-09-25 PROCEDURE — G0481 DRUG TEST DEF 8-14 CLASSES: HCPCS | Performed by: NURSE PRACTITIONER

## 2017-09-25 PROCEDURE — 80307 DRUG TEST PRSMV CHEM ANLYZR: CPT | Performed by: NURSE PRACTITIONER

## 2017-09-25 PROCEDURE — 99214 OFFICE O/P EST MOD 30 MIN: CPT | Performed by: NURSE PRACTITIONER

## 2017-09-25 NOTE — PROGRESS NOTES
"Heber Houston is a 61 y.o. male.   1955    HPI:   Location: bilateral leg  Quality: aching and electric feeling achey  Severity: 5/10  Timing: constant  Alleviating: pain medication  Aggravating: increased activity      Patient referred to pain management via Dr. Alberto related to chronic lower back pain with bilateral leg pain and left knee pain. Pt reports lower back pain for years, right leg pain posterior down to feet. No loss of bowel or bladder. Images in past DDD. NO surgeries. NO PT. NO orthopedics.  Pt with complaints of left knee pain- pt reports left knee injections in past. Would like to have surgery at some point. I have read the ancillary notes and images. Pt reports \"Tramadol 50mg 2 TID was effective\". Pt was given Gabapentin with side effects.  Was referred to pain management Michele nara- stopped going related to travel. I have reviewed notes.  Explained in great detail history and physical, examination, ancillary physician notes and images reviewed, and pain management options.          Jaspal Perales MD     6/30/2017  1:46 PM  LG Arthrocentesis  Consent given by: patient  Timeout: immediately prior to procedure the correct patient,   procedure, and site was verified   Indications: pain   Guidance: no guidance  Location: knee - L knee  Preparation: patient was prepped and draped in the usual sterile   fashion  Needle gauge: 25 G  Approach: anteromedial  Medications administered: 1 mL methylPREDNISolone acetate 40   mg/mL, 4 mL lidocaine 10 mg/mL (1 %)  Patient tolerance: patient tolerated the procedure well with no   immediate complications        The following portions of the patient's history were reviewed by me and updated as appropriate: allergies, current medications, past family history, past medical history, past social history, past surgical history and problem list.    Past Medical History:   Diagnosis Date   • Chronic pain disorder    • Hypertension    • Leg pain, bilateral  "   • Stroke 2012    tia       Social History     Social History   • Marital status:      Spouse name: N/A   • Number of children: N/A   • Years of education: N/A     Occupational History   • Not on file.     Social History Main Topics   • Smoking status: Current Every Day Smoker     Packs/day: 1.00     Years: 44.00     Types: Cigarettes   • Smokeless tobacco: Never Used   • Alcohol use 3.0 oz/week     5 Cans of beer per week      Comment: 6 PK/WK   • Drug use: No   • Sexual activity: Not Currently     Other Topics Concern   • Not on file     Social History Narrative       Family History   Problem Relation Age of Onset   • Coronary artery disease Mother    • Hypertension Mother    • Coronary artery disease Father    • Hypertension Father          Current Outpatient Prescriptions:   •  amLODIPine (NORVASC) 5 MG tablet, Take 5 mg by mouth Daily., Disp: , Rfl:   •  clopidogrel (PLAVIX) 75 MG tablet, Take 75 mg by mouth Daily., Disp: , Rfl:   •  diazePAM (VALIUM) 5 MG tablet, Take 5 mg by mouth 2 (Two) Times a Day As Needed for Anxiety., Disp: , Rfl:   •  EPINEPHrine (EPIPEN 2-ELIDIA) 0.3 MG/0.3ML solution auto-injector injection, Inject 0.3 mL under the skin As Needed (anaphylaxis)., Disp: 1 each, Rfl: 1  •  raNITIdine (ZANTAC) 150 MG tablet, Take 150 mg by mouth 2 (Two) Times a Day., Disp: , Rfl:   •  traMADol (ULTRAM) 50 MG tablet, Take 50 mg by mouth Every 6 (Six) Hours As Needed for Moderate Pain (4-6)., Disp: , Rfl:   •  pravastatin (PRAVACHOL) 40 MG tablet, Take 40 mg by mouth Daily., Disp: , Rfl:   •  rOPINIRole (REQUIP) 0.25 MG tablet, Take 1 tablet by mouth Every Night. Take 1 hour before bedtime., Disp: 30 tablet, Rfl: 5    Allergies   Allergen Reactions   • Lisinopril Swelling   • Lisinopril-Hydrochlorothiazide Swelling       Review of Systems   HENT:        Ryan  tension     Musculoskeletal:        B.leg pain     All other systems reviewed and are negative.    All review of systems reviewed and negative  except for above.    Physical Exam   Constitutional: He is oriented to person, place, and time. He appears well-developed and well-nourished.   HENT:   Head: Normocephalic and atraumatic.   Eyes: Conjunctivae and EOM are normal. Pupils are equal, round, and reactive to light.   Neck: Normal range of motion. Neck supple.   Cardiovascular: Normal rate and regular rhythm.    Pulmonary/Chest: Effort normal and breath sounds normal.   Abdominal: Soft. Bowel sounds are normal.   Musculoskeletal:        Left knee: He exhibits abnormal meniscus ( positive Jose). He exhibits normal range of motion, no swelling, no LCL laxity and no MCL laxity. Tenderness found.        Lumbar back: He exhibits decreased range of motion ( flex 60 deg and 10 deg ext with oscar facet loading. ) and tenderness.   Neurological: He is alert and oriented to person, place, and time. He displays no tremor and normal reflexes. A sensory deficit ( bilateral feet numbness reported) is present. No cranial nerve deficit. He exhibits normal muscle tone. He displays no seizure activity. Coordination and gait normal.   Reflex Scores:       Tricep reflexes are 2+ on the right side and 2+ on the left side.       Bicep reflexes are 2+ on the right side and 2+ on the left side.       Brachioradialis reflexes are 2+ on the right side and 2+ on the left side.       Patellar reflexes are 2+ on the right side and 2+ on the left side.       Achilles reflexes are 1+ on the right side and 1+ on the left side.  Mild bilateral R>L     Upper arm strength 5/5    Lower leg strength 5/5- bilateral lateral leg pain    Skin: Skin is warm and dry.   Psychiatric: He has a normal mood and affect. His behavior is normal. He expresses no homicidal and no suicidal ideation. He expresses no suicidal plans and no homicidal plans.   Nursing note and vitals reviewed.      Heber was seen today for pain.    Diagnoses and all orders for this visit:    Chronic low back pain, unspecified  back pain laterality, with sciatica presence unspecified  -     MRI Lumbar Spine Without Contrast; Future    Lumbosacral spondylosis without myelopathy  -     MRI Lumbar Spine Without Contrast; Future    Lumbar facet arthropathy  -     MRI Lumbar Spine Without Contrast; Future    Chronic pain of left knee    Myofacial muscle pain  -     ToxASSURE Select 13 (MW)    High risk medications (not anticoagulants) long-term use        Medication: Patient reports no negative side effects, Patient reports appropriate usage and storage habits and Opioid contract was read and discussed today and the patient chooses to sign. Tramadol 50mg qid- may increase. Discussed case with Randy Faustin, opiate medication refilled ×2 hand written scripts. I have ordered and discussed compounded cream. Explained in great detail history and physical, examination, ancillary physician notes and images reviewed, and pain management options.        Interventional: I have ordered MRI of lower back related to increase bilateral leg pain, this will asst me in decision making process of injections, referral, and adjustments. Pt requests OPEN MRI related to nerves- I offered 1 x RX for valium declined. CHARLES and any neurological impairments discussed with patient that may need of emergency evaluation.        I have reviewed ancillary notes and images for today's examination;  1. Mild degenerative changes throughout the lumbar spine as described above. No acute osseous abnormality involving the lumbar spine.  2. Mild anterior wedging of the T11 and T12 vertebral bodies is which are stable compared to an old CT chest 9/19/2016. These are consistent with chronic compression deformities   Result Narrative   Exam: Five view lumbar spine    Comparison: None    History: Chronic left-sided low back pain with left-sided sciatica    Findings:  AP, lateral, and oblique views of the lumbar spine are obtained with an additional lateral coned in view of the  lumbosacral junction. Lumbar vertebral body heights and alignment are intact. Mild disc space narrowing at L2-3. The remaining intervertebral   disc spaces are well preserved. There is mild anterior wedging of the T11 and T12 vertebral bodies. Small anterior osteophytes are present at L2-3. There is mild multilevel facet arthropathy     ___________________________________________________________________________    The chondrocalcinosis, small effusion, and calcified menisci raise the possibility of pseudogout.   Result Narrative   Indication:  Medial pain left knee.  No injury.    Left Knee, Two Views:  The knee is normally aligned and no fracture is seen.  There is chondrocalcinosis and heavy calcification of the menisci.  There is a small effusion               Rehab:  Works 5 days a week.     Behavioral: No aberrant behavior noted. ABEL Report # [id number] was reviewed and is consistent with stated history    Urine drug screen Ordered today to test for drugs of abuse and prescribed medications     ORT: 2  Pt with mild Depression. NO SI thoughts or SI ideas. Offered counseling information regarding to any situation that patient may need assistance.  Informed patient to seek emergency counseling or treatment if any uncontrollable depression changes or suicidal thoughts are noted.      PHQ-9: 6          This document has been electronically signed by DANAE Carpenter on September 25, 2017 10:23 AM          This document has been electronically signed by DANAE Carpenter on September 25, 2017 10:23 AM

## 2017-10-01 LAB — CONV REPORT SUMMARY: NORMAL

## 2017-10-13 DIAGNOSIS — M54.5 CHRONIC LOW BACK PAIN, UNSPECIFIED BACK PAIN LATERALITY, WITH SCIATICA PRESENCE UNSPECIFIED: ICD-10-CM

## 2017-10-13 DIAGNOSIS — M47.817 LUMBOSACRAL SPONDYLOSIS WITHOUT MYELOPATHY: ICD-10-CM

## 2017-10-13 DIAGNOSIS — M47.816 LUMBAR FACET ARTHROPATHY: ICD-10-CM

## 2017-10-13 DIAGNOSIS — G89.29 CHRONIC LOW BACK PAIN, UNSPECIFIED BACK PAIN LATERALITY, WITH SCIATICA PRESENCE UNSPECIFIED: ICD-10-CM

## 2017-11-06 ENCOUNTER — APPOINTMENT (OUTPATIENT)
Dept: LAB | Facility: HOSPITAL | Age: 62
End: 2017-11-06

## 2017-11-06 ENCOUNTER — TRANSCRIBE ORDERS (OUTPATIENT)
Dept: LAB | Facility: HOSPITAL | Age: 62
End: 2017-11-06

## 2017-11-06 DIAGNOSIS — G60.8 OTHER HEREDITARY AND IDIOPATHIC NEUROPATHIES: Primary | ICD-10-CM

## 2017-11-06 DIAGNOSIS — E61.1 IRON DEFICIENCY: ICD-10-CM

## 2017-11-06 DIAGNOSIS — E11.9 DIABETES MELLITUS WITH NO COMPLICATION (HCC): ICD-10-CM

## 2017-11-06 DIAGNOSIS — E55.9 VITAMIN D DEFICIENCY DISEASE: ICD-10-CM

## 2017-11-06 DIAGNOSIS — E03.9 HYPOTHYROIDISM, UNSPECIFIED TYPE: ICD-10-CM

## 2017-11-06 DIAGNOSIS — D51.9 ANEMIA DUE TO VITAMIN B12 DEFICIENCY, UNSPECIFIED B12 DEFICIENCY TYPE: ICD-10-CM

## 2017-11-06 DIAGNOSIS — Z13.1 ENCOUNTER FOR SCREENING FOR DIABETES MELLITUS: ICD-10-CM

## 2017-11-06 LAB
25(OH)D3 SERPL-MCNC: 33.7 NG/ML (ref 30–100)
ALBUMIN SERPL-MCNC: 4.7 G/DL (ref 3.4–4.8)
ALP SERPL-CCNC: 53 U/L (ref 38–126)
ALT SERPL W P-5'-P-CCNC: 43 U/L (ref 21–72)
AST SERPL-CCNC: 82 U/L (ref 17–59)
BILIRUB CONJ SERPL-MCNC: 0 MG/DL (ref 0–0.3)
BILIRUB INDIRECT SERPL-MCNC: 0.2 MG/DL (ref 0–1.1)
BILIRUB SERPL-MCNC: 0.6 MG/DL (ref 0.2–1.3)
FOLATE SERPL-MCNC: 11.5 NG/ML (ref 2.76–21)
HBA1C MFR BLD: 5.6 % (ref 4–5.6)
IRON 24H UR-MRATE: 153 MCG/DL (ref 49–181)
MAGNESIUM SERPL-MCNC: 2 MG/DL (ref 1.6–2.3)
PROT SERPL-MCNC: 8 G/DL (ref 6.3–8.6)
TSH SERPL DL<=0.05 MIU/L-ACNC: 1 MIU/ML (ref 0.46–4.68)
VIT B12 BLD-MCNC: 453 PG/ML (ref 239–931)

## 2017-11-06 PROCEDURE — 82306 VITAMIN D 25 HYDROXY: CPT | Performed by: PSYCHIATRY & NEUROLOGY

## 2017-11-06 PROCEDURE — 83540 ASSAY OF IRON: CPT | Performed by: PSYCHIATRY & NEUROLOGY

## 2017-11-06 PROCEDURE — 80076 HEPATIC FUNCTION PANEL: CPT | Performed by: PSYCHIATRY & NEUROLOGY

## 2017-11-06 PROCEDURE — 84443 ASSAY THYROID STIM HORMONE: CPT | Performed by: PSYCHIATRY & NEUROLOGY

## 2017-11-06 PROCEDURE — 36415 COLL VENOUS BLD VENIPUNCTURE: CPT | Performed by: PSYCHIATRY & NEUROLOGY

## 2017-11-06 PROCEDURE — 82746 ASSAY OF FOLIC ACID SERUM: CPT | Performed by: PSYCHIATRY & NEUROLOGY

## 2017-11-06 PROCEDURE — 82525 ASSAY OF COPPER: CPT | Performed by: PSYCHIATRY & NEUROLOGY

## 2017-11-06 PROCEDURE — 83036 HEMOGLOBIN GLYCOSYLATED A1C: CPT | Performed by: PSYCHIATRY & NEUROLOGY

## 2017-11-06 PROCEDURE — 83735 ASSAY OF MAGNESIUM: CPT | Performed by: PSYCHIATRY & NEUROLOGY

## 2017-11-06 PROCEDURE — 82607 VITAMIN B-12: CPT | Performed by: PSYCHIATRY & NEUROLOGY

## 2017-11-06 PROCEDURE — 84425 ASSAY OF VITAMIN B-1: CPT | Performed by: PSYCHIATRY & NEUROLOGY

## 2017-11-08 LAB
COPPER SERPL-MCNC: 104 UG/DL (ref 72–166)
VIT B1 BLD-SCNC: 148.9 NMOL/L (ref 66.5–200)

## 2018-03-13 ENCOUNTER — TRANSCRIBE ORDERS (OUTPATIENT)
Dept: PHYSICAL THERAPY | Facility: HOSPITAL | Age: 63
End: 2018-03-13

## 2018-03-13 DIAGNOSIS — G89.29 CHRONIC PAIN OF LEFT KNEE: Primary | ICD-10-CM

## 2018-03-13 DIAGNOSIS — M25.562 CHRONIC PAIN OF LEFT KNEE: Primary | ICD-10-CM

## 2018-03-30 ENCOUNTER — HOSPITAL ENCOUNTER (OUTPATIENT)
Dept: PHYSICAL THERAPY | Facility: HOSPITAL | Age: 63
Setting detail: THERAPIES SERIES
Discharge: HOME | End: 2018-03-30

## 2018-03-30 DIAGNOSIS — M25.562 CHRONIC PAIN OF LEFT KNEE: Primary | ICD-10-CM

## 2018-03-30 DIAGNOSIS — Z96.652 TOTAL KNEE REPLACEMENT STATUS, LEFT: ICD-10-CM

## 2018-03-30 DIAGNOSIS — G89.29 CHRONIC PAIN OF LEFT KNEE: Primary | ICD-10-CM

## 2018-03-30 PROCEDURE — 97110 THERAPEUTIC EXERCISES: CPT | Performed by: PHYSICAL THERAPIST

## 2018-03-30 PROCEDURE — 97162 PT EVAL MOD COMPLEX 30 MIN: CPT | Performed by: PHYSICAL THERAPIST

## 2018-03-30 PROCEDURE — G0283 ELEC STIM OTHER THAN WOUND: HCPCS | Performed by: PHYSICAL THERAPIST

## 2018-03-30 NOTE — THERAPY EVALUATION
Outpatient Physical Therapy Ortho Initial Evaluation  [facility]     Patient Name: Heber Houston  : 1955  MRN: [id number]  Today's Date: 3/30/2018      Visit Date: 2018  Attendance:  (no visit limit)  Subjective Improvement: n/a  Next MD Appt: approx 2 weeks  Recert Date: 18    Therapy Diagnosis: L TKA 3/26/18         Past Medical History:   Diagnosis Date   • Chronic pain disorder    • COPD (chronic obstructive pulmonary disease)    • Hypertension    • Leg pain, bilateral    • Stroke     tia        Past Surgical History:   Procedure Laterality Date   • CAROTID ARTERY ANGIOPLASTY Left    • CHOLECYSTECTOMY     • ENDOSCOPY N/A 2017    Procedure: ESOPHAGOGASTRODUODENOSCOPY;  Surgeon: Sharan Gupta DO;  Location: [facility] ENDOSCOPY;  Service:    • JOINT REPLACEMENT Left 2018    total knee       Current Outpatient Prescriptions: (per EMR)  •  amLODIPine (NORVASC) 5 MG tablet, Take 5 mg by mouth Daily., Disp: , Rfl:   •  clopidogrel (PLAVIX) 75 MG tablet, Take 75 mg by mouth Daily., Disp: , Rfl:   •  diazePAM (VALIUM) 5 MG tablet, Take 5 mg by mouth 2 (Two) Times a Day As Needed for Anxiety., Disp: , Rfl:   •  EPINEPHrine (EPIPEN 2-ELIDIA) 0.3 MG/0.3ML solution auto-injector injection, Inject 0.3 mL under the skin As Needed (anaphylaxis)., Disp: 1 each, Rfl: 1  •  pravastatin (PRAVACHOL) 40 MG tablet, Take 40 mg by mouth Daily., Disp: , Rfl:   •  raNITIdine (ZANTAC) 150 MG tablet, Take 150 mg by mouth 2 (Two) Times a Day., Disp: , Rfl:   •  rOPINIRole (REQUIP) 0.25 MG tablet, Take 1 tablet by mouth Every Night. Take 1 hour before bedtime., Disp: 30 tablet, Rfl: 5  •  traMADol (ULTRAM) 50 MG tablet, Take 50 mg by mouth Every 6 (Six) Hours As Needed for Moderate Pain (4-6)., Disp: , Rfl:     Allergies   Allergen Reactions   • Lisinopril Swelling   • Lisinopril-Hydrochlorothiazide Swelling         Visit Dx:     ICD-10-CM ICD-9-CM   1. Chronic pain of left knee M25.562  719.46    G89.29 338.29   2. Total knee replacement status, left Z96.652 V43.65             Patient History     Row Name 03/30/18 0800             History    Chief Complaint Difficulty Walking;Difficulty with daily activities;Pain  -SS      Type of Pain Knee pain   left  -SS      Date Current Problem(s) Began --   chronic  -SS      Brief Description of Current Complaint Patient underwent L TKA on 3/26/18 due to chronic knee pain. Discharged from [facility] on 3/27/18. Burning pain in the lateral calf at present. Returns to Dr. Schrader's office approximately 2 weeks post-op. He is walking around the house for 10 minutes every hour that he's awake. Using a CPM.  male with children. Lives in a single story house with 2 steps to enter and none inside.  -SS      Patient/Caregiver Goals Improve mobility;Relieve pain  -SS      Current Tobacco Use smoker - recently stopped  -      Smoking Status cigarettes  -      Patient's Rating of General Health Good   good to fair  -      Occupation/sports/leisure activities [employer] -- , off work since surgery. Hobbies: motorcycles, wood working, gun & knife collecting  -      Surgery/Hospitalization 3/26/18   discharge 3/27/18  -SS         Pain     Pain Location Knee   left  -SS      Pain at Present 2;3  -SS      Pain at Best 2  -SS      Pain at Worst 7;8  -SS      Pain Frequency Constant/continuous  -      Pain Description Burning;Sharp  -SS      What Performance Factors Make the Current Problem(s) WORSE? movement  -SS      What Performance Factors Make the Current Problem(s) BETTER? pain medication, lay on couch  -SS      Is your sleep disturbed? Yes  -SS      Is medication used to assist with sleep? --   occasional  -SS      Difficulties at work? off work  -SS      Difficulties with ADL's? decreased  -SS      Difficulties with recreational activities? decreased  -SS         Fall Risk Assessment    Any falls in the past year: Yes  -SS       Number of falls reported in the last 12 months 1  -SS      Factors that contributed to the fall: Slippery surface   slipped on snow  -SS      Does patient have a fear of falling Yes (comment)  -SS         Daily Activities    Primary Language English  -SS         Safety    Are you being hurt, hit, or frightened by anyone at home or in your life? No  -SS      Are you being neglected by a caregiver No  -SS        User Key  (r) = Recorded By, (t) = Taken By, (c) = Cosigned By    Initials Name Provider Type    LINA Dickey, PT DPT Physical Therapist                PT Ortho     Row Name 03/30/18 0800       Subjective Comments    Subjective Comments see Therapy Patient History  -SS       Precautions and Contraindications    Precautions TKA 3/26/18  -SS       Subjective Pain    Able to rate subjective pain? yes  -SS    Pre-Treatment Pain Level 3  -SS       Posture/Observations    Posture/Observations Comments Presents ambulating with rolling walker, immobilizer L knee.   -SS       Left Lower Ext    Lt Knee Extension/Flexion AROM 0-10-40  -SS       Lower Extremity (Manual Muscle Testing)    Comment, MMT: Lower Extremity MMT deferred  -SS      User Key  (r) = Recorded By, (t) = Taken By, (c) = Cosigned By    Initials Name Provider Type    LINA Dickey, PT DPT Physical Therapist                      Therapy Education  Education Details: seated heel slides, quad sets  Given: HEP  Program: New  How Provided: Verbal, Demonstration  Provided to: Patient  Level of Understanding: Verbalized, Demonstrated           PT OP Goals     Row Name 03/30/18 0800          PT Short Term Goals    STG Date to Achieve 04/20/18  -SS     STG 1 LEFS score to be >/= 20/80  -     STG 2 Note a >/= 25% subjective improvement  -     STG 3 L knee active extension to -5 degrees or better  -     STG 4 L knee active flexion to >/= 90 degrees  -        Long Term Goals    LTG Date to Achieve 05/11/18  -SS     LTG 1 Independent  with HEP  -SS     LTG 2 LEFS score to be >/= 60/80  -     LTG 3 L knee active extension 0 degrees  -     LTG 4 L knee active flexion >/= 120 degrees  -     LTG 5 Demonstrate ability to ascend 5 stairs reciprocally.  -        Time Calculation    PT Goal Re-Cert Due Date 04/20/18  -       User Key  (r) = Recorded By, (t) = Taken By, (c) = Cosigned By    Initials Name Provider Type     Pablo Dickey, PT DPT Physical Therapist                PT Assessment/Plan     Row Name 03/30/18 0800          PT Assessment    Functional Limitations Impaired gait;Limitation in home management;Limitations in community activities;Limitations in functional capacity and performance;Performance in leisure activities;Performance in self-care ADL;Performance in work activities  -     Impairments Edema;Endurance;Gait;Range of motion;Muscle strength;Pain;Integumentary integrity  -     Assessment Comments Patient is 4 days s/p L TKA. Pain and fear limited this date. Strongly encouraged patient to perform seated heel slides and quad sets for HEP. Wear immobilizer at night and as needed during the day.  -     Rehab Potential Good   barrier: pain avoidance behaviors noted this date  -     Patient/caregiver participated in establishment of treatment plan and goals Yes  -     Patient would benefit from skilled therapy intervention Yes  -SS        PT Plan    Predicted Duration of Therapy Intervention (PT Eval) 3/wk x 3-4 weeks then 2/wk x 2-3 weeks for anticipated 6 weeks total  -     PT Plan Comments ROM, stretching, strengthening, gait training, stair training, IFC estim, ice  -       User Key  (r) = Recorded By, (t) = Taken By, (c) = Cosigned By    Initials Name Provider Type     Pablo Dickey, PT DPT Physical Therapist                Modalities     Row Name 03/30/18 0800             Ice    Ice Applied Yes  -      Location L knee concurrent with IFC  -      Rx Minutes Other:   20 mins  -      Ice  Prior to Rx No  -SS      Ice S/P Rx Yes  -SS         ELECTRICAL STIMULATION    Attended/Unattended Unattended  -SS      Stimulation Type IFC  -SS      Location/Electrode Placement/Other L knee concurrent with ice  -SS      Rx Minutes 20 mins  -SS        User Key  (r) = Recorded By, (t) = Taken By, (c) = Cosigned By    Initials Name Provider Type    LINA Dickey, PT DPT Physical Therapist              Exercises     Row Name 03/30/18 0800             Subjective Comments    Subjective Comments see Therapy Patient History  -SS         Subjective Pain    Able to rate subjective pain? yes  -SS      Pre-Treatment Pain Level 3  -SS      Post-Treatment Pain Level 3  -SS         Exercise 1    Exercise Name 1 Pro2, Seat 19, Rock for ROM  -SS      Cueing 1 Verbal;Tactile  -SS      Time 1 5 mins  -SS         Exercise 2    Exercise Name 2 Quad sets with heel prop  -SS      Cueing 2 Verbal  -SS      Sets 2 1  -SS      Reps 2 10  -SS      Time 2 5 sec hold  -SS        User Key  (r) = Recorded By, (t) = Taken By, (c) = Cosigned By    Initials Name Provider Type    LINA Dickey, PT DPT Physical Therapist                        Outcome Measure Options: Lower Extremity Functional Scale (LEFS)  Lower Extremity Functional Index  Any of your usual work, housework or school activities: Extreme difficulty or unable to perform activity  Your usual hobbies, recreational or sporting activities: Extreme difficulty or unable to perform activity  Getting into or out of the bath: Extreme difficulty or unable to perform activity  Walking between rooms: Moderate difficulty  Putting on your shoes or socks: Quite a bit of difficulty  Squatting: Extreme difficulty or unable to perform activity  Lifting an object, like a bag of groceries from the floor: Extreme difficulty or unable to perform activity  Performing light activities around your home: Quite a bit of difficulty  Performing heavy activities around your home: Extreme  difficulty or unable to perform activity  Getting into or out of a car: Quite a bit of difficulty  Walking 2 blocks: Extreme difficulty or unable to perform activity  Walking a mile: Extreme difficulty or unable to perform activity  Going up or down 10 stairs (about 1 flight of stairs): Extreme difficulty or unable to perform activity  Standing for 1 hour: Extreme difficulty or unable to perform activity  Sitting for 1 hour: Extreme difficulty or unable to perform activity  Running on even ground: Extreme difficulty or unable to perform activity  Running on uneven ground: Extreme difficulty or unable to perform activity  Making sharp turns while running fast: Extreme difficulty or unable to perform activity  Hopping: Extreme difficulty or unable to perform activity  Rolling over in bed: Extreme difficulty or unable to perform activity  Total: 5      Time Calculation:   Start Time: 0845  Stop Time: 0955  Time Calculation (min): 70 min     Therapy Charges for Today     Code Description Service Date Service Provider Modifiers Qty    01917530894 HC PT EVAL MOD COMPLEXITY 3 3/30/2018 Pablo Dickey, PT DPT GP 1    82039237227 HC PT THER PROC EA 15 MIN 3/30/2018 Pablo Dickey PT DPT GP 1    19049335891 HC PT ELECTRICAL STIM UNATTENDED 3/30/2018 Pablo Dickey PT DPT  1    25355781425 HC PT THER SUPP EA 15 MIN 3/30/2018 Pablo Dickey PT DPT GP 1                   Pablo Dickey, PT, DPT, CHT  3/30/2018

## 2018-04-02 ENCOUNTER — HOSPITAL ENCOUNTER (OUTPATIENT)
Dept: PHYSICAL THERAPY | Facility: HOSPITAL | Age: 63
Setting detail: THERAPIES SERIES
Discharge: HOME | End: 2018-04-02

## 2018-04-02 DIAGNOSIS — M25.562 CHRONIC PAIN OF LEFT KNEE: Primary | ICD-10-CM

## 2018-04-02 DIAGNOSIS — Z96.652 TOTAL KNEE REPLACEMENT STATUS, LEFT: ICD-10-CM

## 2018-04-02 DIAGNOSIS — G89.29 CHRONIC PAIN OF LEFT KNEE: Primary | ICD-10-CM

## 2018-04-02 PROCEDURE — 97110 THERAPEUTIC EXERCISES: CPT

## 2018-04-02 NOTE — THERAPY TREATMENT NOTE
Outpatient Physical Therapy Ortho Treatment Note  [facility]     Patient Name: Heber Houston  : 1955  MRN: [id number]  Today's Date: 2018      Visit Date: 2018     Sub imp 0%  Visit  DADA MARQUEZ mariza. 18  RE 18    Visit Dx:    ICD-10-CM ICD-9-CM   1. Chronic pain of left knee M25.562 719.46    G89.29 338.29   2. Total knee replacement status, left Z96.652 V43.65       Patient Active Problem List   Diagnosis   • Allergic angioedema   • Restless leg syndrome   • Neuropathic pain of both legs        Past Medical History:   Diagnosis Date   • Chronic pain disorder    • COPD (chronic obstructive pulmonary disease)    • Hypertension    • Leg pain, bilateral    • Stroke     tia        Past Surgical History:   Procedure Laterality Date   • CAROTID ARTERY ANGIOPLASTY Left    • CHOLECYSTECTOMY     • ENDOSCOPY N/A 2017    Procedure: ESOPHAGOGASTRODUODENOSCOPY;  Surgeon: Sharan Gputa DO;  Location: [facility] ENDOSCOPY;  Service:    • JOINT REPLACEMENT Left 2018    total knee             PT Ortho     Row Name 18 1400       Left Lower Ext    Lt Knee Extension/Flexion AROM (P)  0-6-40  -LORI    Row Name 18 1300       Posture/Observations    Posture/Observations Comments (P)  Presents with Leg Immobizer with RW  -LORI      User Key  (r) = Recorded By, (t) = Taken By, (c) = Cosigned By    Initials Name Provider Type    LORI Gann ATC                             PT Assessment/Plan     Row Name 18 1423          PT Assessment    Assessment Comments (P)  Minimal AROM. Slight improvement with knee extension. Pt defers IFC.   -LORI        PT Plan    PT Frequency (P)  3x/week;2x/week  -LORI     Predicted Duration of Therapy Intervention (OT Eval) (P)  6 weeks  -LORI     PT Plan Comments (P)  ROM, stretch, gait  -LORI       User Key  (r) = Recorded By, (t) = Taken By, (c) = Cosigned By    Initials Name Provider Type    LORI Gann ATC Athletic  Trainer                Modalities     Row Name 04/02/18 1300             Ice    Ice Applied (P)  Yes  -LORI      Location (P)  L knee  -LORI      Rx Minutes (P)  15 mins  -LORI      Ice Prior to Rx (P)  --  -LORI      Ice S/P Rx (P)  Yes  -LORI         ELECTRICAL STIMULATION    Attended/Unattended (P)  --  -LORI      Stimulation Type (P)  --  -LORI      Location/Electrode Placement/Other (P)  --  -LORI      Rx Minutes (P)  --  -LORI        User Key  (r) = Recorded By, (t) = Taken By, (c) = Cosigned By    Initials Name Provider Type    LORI Gann ATC                 Exercises     Row Name 04/02/18 1300             Subjective Pain    Able to rate subjective pain? (P)  yes  -LORI      Pre-Treatment Pain Level (P)  3  -LORI      Post-Treatment Pain Level (P)  3  -LORI         Exercise 1    Exercise Name 1 (P)  QS w/Ext prop  -LORI      Time 1 (P)  20  -LORI         Exercise 2    Exercise Name 2 (P)  Iso Add  -LORI      Sets 2 (P)  2  -LORI      Reps 2 (P)  10  -LORI         Exercise 3    Exercise Name 3 (P)  SLR w/Assist  -LORI      Sets 3 (P)  2  -LORI      Reps 3 (P)  5  -LORI      Additional Comments (P)  max assist  -LORI         Exercise 4    Exercise Name 4 (P)  SAQ  -LORI      Sets 4 (P)  2  -LORI      Reps 4 (P)  5  -LORI      Additional Comments (P)  Max Assist  -LORI         Exercise 5    Exercise Name 5 (P)  Pro II seat 19  -LORI      Time 5 (P)  8  -LORI      Additional Comments (P)  L1 rocking  -LORI        User Key  (r) = Recorded By, (t) = Taken By, (c) = Cosigned By    Initials Name Provider Type    LORI Gann [facility]                                PT OP Goals     Row Name 04/02/18 1300          PT Short Term Goals    STG Date to Achieve (P)  04/20/18  -LORI     STG 1 (P)  LEFS score to be >/= 20/80  -LORI     STG 1 Progress (P)  Not Met  -LORI     STG 2 (P)  Note a >/= 25% subjective improvement  -LORI     STG 2 Progress (P)  Not Met  -LORI     STG 3 (P)  L knee active extension to -5 degrees or better  -LORI     STG 3 Progress  (P)  Progressing  -     STG 4 (P)  L knee active flexion to >/= 90 degrees  -     STG 4 Progress (P)  Not Met  -        Long Term Goals    LTG Date to Achieve (P)  05/11/18  -     LTG 1 (P)  Independent with HEP  -     LTG 2 (P)  LEFS score to be >/= 60/80  -     LTG 3 (P)  L knee active extension 0 degrees  -     LTG 4 (P)  L knee active flexion >/= 120 degrees  -     LTG 5 (P)  Demonstrate ability to ascend 5 stairs reciprocally.  -       User Key  (r) = Recorded By, (t) = Taken By, (c) = Cosigned By    Initials Name Provider Type    LORI Calvin Gann, ATC           Therapy Education  Given: (P) HEP  Program: (P) Reinforced  How Provided: (P) Verbal  Provided to: (P) Patient  Level of Understanding: (P) Verbalized              Time Calculation:   Start Time: (P) 1340  Stop Time: (P) 1435  Time Calculation (min): (P) 55 min  Total Timed Code Minutes- PT: (P) 40 minute(s)    Therapy Charges for Today     Code Description Service Date Service Provider Modifiers Qty    40334439485 HC PT THER SUPP EA 15 MIN 4/2/2018 Calvin Gann ATC  1    56473280387 HC PT THER PROC EA 15 MIN 4/2/2018 Calvin Gann ATC  3                    Calvin Gann ATC  4/2/2018

## 2018-04-04 ENCOUNTER — HOSPITAL ENCOUNTER (OUTPATIENT)
Dept: PHYSICAL THERAPY | Facility: HOSPITAL | Age: 63
Setting detail: THERAPIES SERIES
Discharge: HOME | End: 2018-04-04

## 2018-04-04 DIAGNOSIS — Z96.652 TOTAL KNEE REPLACEMENT STATUS, LEFT: ICD-10-CM

## 2018-04-04 DIAGNOSIS — M25.562 CHRONIC PAIN OF LEFT KNEE: Primary | ICD-10-CM

## 2018-04-04 DIAGNOSIS — G89.29 CHRONIC PAIN OF LEFT KNEE: Primary | ICD-10-CM

## 2018-04-04 PROCEDURE — 97110 THERAPEUTIC EXERCISES: CPT

## 2018-04-04 NOTE — THERAPY TREATMENT NOTE
Outpatient Physical Therapy Ortho Treatment Note  [facility]     Patient Name: Heber Houston  : 1955  MRN: [id number]  Today's Date: 2018      Visit Date: 2018     Sub imp 15-17%  Visit 3/3 DADA MARQUEZ 4/10/18  Re 18    Visit Dx:    ICD-10-CM ICD-9-CM   1. Chronic pain of left knee M25.562 719.46    G89.29 338.29   2. Total knee replacement status, left Z96.652 V43.65       Patient Active Problem List   Diagnosis   • Allergic angioedema   • Restless leg syndrome   • Neuropathic pain of both legs        Past Medical History:   Diagnosis Date   • Chronic pain disorder    • COPD (chronic obstructive pulmonary disease)    • Hypertension    • Leg pain, bilateral    • Stroke     tia        Past Surgical History:   Procedure Laterality Date   • CAROTID ARTERY ANGIOPLASTY Left    • CHOLECYSTECTOMY     • ENDOSCOPY N/A 2017    Procedure: ESOPHAGOGASTRODUODENOSCOPY;  Surgeon: Sharan Gupta DO;  Location: [facility] ENDOSCOPY;  Service:    • JOINT REPLACEMENT Left 2018    total knee             PT Ortho     Row Name 18 1000       Posture/Observations    Posture/Observations Comments (P)  presents with walker and immobilizer  -LORI       Left Lower Ext    Lt Knee Extension/Flexion AROM (P)  0-2-51 post  -LORI    Row Name 18 1400       Left Lower Ext    Lt Knee Extension/Flexion AROM (P)  0-6-40  -LORI    Row Name 18 1300       Posture/Observations    Posture/Observations Comments (P)  Presents with Leg Immobizer with RW  -      User Key  (r) = Recorded By, (t) = Taken By, (c) = Cosigned By    Initials Name Provider Type    LORI Gann ATC     LORI Gann ATC                             PT Assessment/Plan     Row Name 18 1131          PT Assessment    Assessment Comments (P)  Slow improvement with Knee flexion. Emphasized need to focus on at home. D/C immobilizer during day. To wear at night as needed. Focused on  "knee flexion today  -LORI        PT Plan    PT Frequency (P)  3x/week;2x/week  -LORI     Predicted Duration of Therapy Intervention (OT Eval) (P)  6 weeks  -LORI     PT Plan Comments (P)  cont to focus on knee flexion . Biodex next if not improving signficantly with knee flexion  -LORI       User Key  (r) = Recorded By, (t) = Taken By, (c) = Cosigned By    Initials Name Provider Type    LORI Gann [facility]                 Modalities     Row Name 04/04/18 1100             Ice    Ice Applied (P)  Yes  -LORI      Location (P)  L knee  -LORI      Rx Minutes (P)  15 mins  -LORI      Ice S/P Rx (P)  Yes  -LORI        User Key  (r) = Recorded By, (t) = Taken By, (c) = Cosigned By    Initials Name Provider Type    LORI Gann [facility]                 Exercises     Row Name 04/04/18 1000             Subjective Comments    Subjective Comments (P)  Moving a little better. Pain not too bad at present will go up  -LORI         Subjective Pain    Able to rate subjective pain? (P)  yes  -LORI      Pre-Treatment Pain Level (P)  3  -LORI      Post-Treatment Pain Level (P)  3  -LORI         Exercise 1    Exercise Name 1 (P)  QS w/ Ext prop  -LORI      Time 1 (P)  5 min  -LORI         Exercise 2    Exercise Name 2 (P)  Iso Add  -LORI      Sets 2 (P)  3  -LORI      Reps 2 (P)  10  -LORI         Exercise 3    Exercise Name 3 (P)  SLR w/ Assist  -LORI      Sets 3 (P)  2  -LORI      Reps 3 (P)  10  -LORI      Additional Comments (P)  Max assist  -LORI         Exercise 4    Exercise Name 4 (P)  knee flexion stretch over bolster  -LORI      Sets 4 (P)  3  -LORI      Time 4 (P)  1\"  -LORI         Exercise 5    Exercise Name 5 (P)  Heel slide with strap and Assist  -LORI      Sets 5 (P)  2  -LORI      Reps 5 (P)  10  -LORI         Exercise 6    Exercise Name 6 (P)  Pro II seat 19  -LORI      Time 6 (P)  8  -LORI      Additional Comments (P)  L1  -LORI        User Key  (r) = Recorded By, (t) = Taken By, (c) = Cosigned By    Initials Name Provider Type    LORI CASE" LILI Gann                                PT OP Goals     Row Name 04/04/18 1000          PT Short Term Goals    STG Date to Achieve (P)  04/20/18  -     STG 1 (P)  LEFS score to be >/= 20/80  -     STG 1 Progress (P)  Not Met  -LORI     STG 2 (P)  Note a >/= 25% subjective improvement  -LORI     STG 2 Progress (P)  Not Met  -     STG 3 (P)  L knee active extension to -5 degrees or better  -LORI     STG 3 Progress (P)  Progressing  -LORI     STG 4 (P)  L knee active flexion to >/= 90 degrees  -LORI     STG 4 Progress (P)  Not Met  -        Long Term Goals    LTG Date to Achieve (P)  05/11/18  -     LTG 1 (P)  Independent with HEP  -     LTG 2 (P)  LEFS score to be >/= 60/80  -     LTG 3 (P)  L knee active extension 0 degrees  -     LTG 4 (P)  L knee active flexion >/= 120 degrees  -     LTG 5 (P)  Demonstrate ability to ascend 5 stairs reciprocally.  -       User Key  (r) = Recorded By, (t) = Taken By, (c) = Cosigned By    Initials Name Provider Type    LORI Calvin Gann ATC           Therapy Education  Given: (P) HEP  Program: (P) Reinforced  How Provided: (P) Verbal  Provided to: (P) Patient  Level of Understanding: (P) Verbalized              Time Calculation:   Start Time: (P) 1055  Stop Time: (P) 1155  Time Calculation (min): (P) 60 min  Total Timed Code Minutes- PT: (P) 45 minute(s)    Therapy Charges for Today     Code Description Service Date Service Provider Modifiers Qty    37993113000 HC PT THER SUPP EA 15 MIN 4/4/2018 Calvin Gann ATC  1    01091908186 HC PT THER PROC EA 15 MIN 4/4/2018 Calvin Gann ATC  3                    Calvin Gann ATC  4/4/2018

## 2018-04-06 ENCOUNTER — HOSPITAL ENCOUNTER (OUTPATIENT)
Dept: PHYSICAL THERAPY | Facility: HOSPITAL | Age: 63
Setting detail: THERAPIES SERIES
Discharge: HOME | End: 2018-04-06

## 2018-04-06 DIAGNOSIS — M25.562 CHRONIC PAIN OF LEFT KNEE: Primary | ICD-10-CM

## 2018-04-06 DIAGNOSIS — G89.29 CHRONIC PAIN OF LEFT KNEE: Primary | ICD-10-CM

## 2018-04-06 DIAGNOSIS — Z96.652 TOTAL KNEE REPLACEMENT STATUS, LEFT: ICD-10-CM

## 2018-04-06 PROCEDURE — 97110 THERAPEUTIC EXERCISES: CPT

## 2018-04-06 NOTE — THERAPY TREATMENT NOTE
Outpatient Physical Therapy Ortho Treatment Note  [facility]     Patient Name: Heber Houston  : 1955  MRN: [id number]  Today's Date: 2018      Visit Date: 2018    Sub imp 15-17%  Visit  DADA MARQUEZ 4/10/18  Re 18    Visit Dx:    ICD-10-CM ICD-9-CM   1. Chronic pain of left knee M25.562 719.46    G89.29 338.29   2. Total knee replacement status, left Z96.652 V43.65       Patient Active Problem List   Diagnosis   • Allergic angioedema   • Restless leg syndrome   • Neuropathic pain of both legs        Past Medical History:   Diagnosis Date   • Chronic pain disorder    • COPD (chronic obstructive pulmonary disease)    • Hypertension    • Leg pain, bilateral    • Stroke     tia        Past Surgical History:   Procedure Laterality Date   • CAROTID ARTERY ANGIOPLASTY Left    • CHOLECYSTECTOMY     • ENDOSCOPY N/A 2017    Procedure: ESOPHAGOGASTRODUODENOSCOPY;  Surgeon: Sharan Gupta DO;  Location: [facility] ENDOSCOPY;  Service:    • JOINT REPLACEMENT Left 2018    total knee             PT Ortho     Row Name 18 1100       Posture/Observations    Posture/Observations Comments (P)  Presents with RW  -LORI       Left Lower Ext    Lt Knee Extension/Flexion AROM (P)  0-2-51 post LLPS  -LORI       MMT (Manual Muscle Testing)    Additional Documentation (P)  --   SLR with moderate assist.  -LORI    Row Name 18 1000       Posture/Observations    Posture/Observations Comments (P)  presents with walker and immobilizer  -LORI       Left Lower Ext    Lt Knee Extension/Flexion AROM (P)  0-2-51 post  -LORI      User Key  (r) = Recorded By, (t) = Taken By, (c) = Cosigned By    Initials Name Provider Type    LORI Gann ATC                             PT Assessment/Plan     Row Name 18 1148          PT Assessment    Assessment Comments (P)  cont decreased ROM. Max of 51 degrees flexion with Assist.   -LORI        PT Plan    PT Frequency (P)  3x/week;2x/week   -LORI     Predicted Duration of Therapy Intervention (OT Eval) (P)  6 weeks  -LORI     PT Plan Comments (P)  MD note next. Cont with ROM.   -LORI       User Key  (r) = Recorded By, (t) = Taken By, (c) = Cosigned By    Initials Name Provider Type    LORI Gann [employer] [facility] [employer]                     Exercises     Row Name 04/06/18 1100             Subjective Comments    Subjective Comments (P)  Reports not using immobilizer that much at home. A little to sleeping. Has turned CPM up to 80 on knee bend  -LORI         Subjective Pain    Able to rate subjective pain? (P)  yes  -LORI      Pre-Treatment Pain Level (P)  3  -LORI      Post-Treatment Pain Level (P)  3  -LORI         Exercise 1    Exercise Name 1 (P)  QS w/ Ext prop  -LORI      Time 1 (P)  5 min.  -LORI         Exercise 2    Exercise Name 2 (P)  QS  -LORI      Sets 2 (P)  3  -LORI      Reps 2 (P)  10  -LORI         Exercise 3    Exercise Name 3 (P)  Iso Add  -LORI      Sets 3 (P)  3  -LORI      Reps 3 (P)  10  -LORI         Exercise 4    Exercise Name 4 (P)  SLR with Assist  -LORI      Sets 4 (P)  2  -LORI      Reps 4 (P)  10  -LORI         Exercise 5    Exercise Name 5 (P)  Knee flexion stretch over bolster, yellow ball, blue ball  -LOIR      Time 5 (P)  10  -LORI         Exercise 6    Exercise Name 6 (P)  Prone knee flexion AAROM  -LORI      Reps 6 (P)  20  -LORI         Exercise 7    Exercise Name 7 (P)  Pro II AAROM L1  -LORI      Time 7 (P)  8  -LORI      Additional Comments (P)  Seat 19  -LORI        User Key  (r) = Recorded By, (t) = Taken By, (c) = Cosigned By    Initials Name Provider Type    LORI Gann [facility]                                PT OP Goals     Row Name 04/06/18 1000          PT Short Term Goals    STG Date to Achieve (P)  04/20/18  -LORI     STG 1 (P)  LEFS score to be >/= 20/80  -LORI     STG 1 Progress (P)  Not Met  -LORI     STG 2 (P)  Note a >/= 25% subjective improvement  -LORI     STG 2 Progress (P)  Not Met  -LORI     STG 3 (P)  L knee active extension to -5  degrees or better  -     STG 3 Progress (P)  Progressing  -     STG 4 (P)  L knee active flexion to >/= 90 degrees  -     STG 4 Progress (P)  Not Met  -        Long Term Goals    LTG Date to Achieve (P)  05/11/18  -     LTG 1 (P)  Independent with HEP  -     LTG 2 (P)  LEFS score to be >/= 60/80  -     LTG 3 (P)  L knee active extension 0 degrees  -     LTG 4 (P)  L knee active flexion >/= 120 degrees  -     LTG 5 (P)  Demonstrate ability to ascend 5 stairs reciprocally.  -       User Key  (r) = Recorded By, (t) = Taken By, (c) = Cosigned By    Initials Name Provider Type    LORI Calvin Gann, LILI           Therapy Education  Given: (P) HEP  Program: (P) Reinforced  How Provided: (P) Verbal  Provided to: (P) Patient  Level of Understanding: (P) Verbalized              Time Calculation:   Start Time: (P) 1055  Stop Time: (P) 1202  Time Calculation (min): (P) 67 min  Total Timed Code Minutes- PT: (P) 52 minute(s)    Therapy Charges for Today     Code Description Service Date Service Provider Modifiers Qty    49396355617 HC PT THER SUPP EA 15 MIN 4/6/2018 Calvin Gann ATC  1    34140727901 HC PT THER PROC EA 15 MIN 4/6/2018 Calvin Gann, ATC  3                    Calvin Gann ATC  4/6/2018

## 2018-04-09 ENCOUNTER — HOSPITAL ENCOUNTER (OUTPATIENT)
Dept: PHYSICAL THERAPY | Facility: HOSPITAL | Age: 63
Setting detail: THERAPIES SERIES
Discharge: HOME | End: 2018-04-09

## 2018-04-09 DIAGNOSIS — G89.29 CHRONIC PAIN OF LEFT KNEE: Primary | ICD-10-CM

## 2018-04-09 DIAGNOSIS — Z96.652 TOTAL KNEE REPLACEMENT STATUS, LEFT: ICD-10-CM

## 2018-04-09 DIAGNOSIS — M25.562 CHRONIC PAIN OF LEFT KNEE: Primary | ICD-10-CM

## 2018-04-09 PROCEDURE — 97110 THERAPEUTIC EXERCISES: CPT | Performed by: PHYSICAL THERAPIST

## 2018-04-09 NOTE — THERAPY TREATMENT NOTE
"    Outpatient Physical Therapy Ortho Treatment Note  [facility]     Patient Name: Heber Houston  : 1955  MRN: [id number]  Today's Date: 2018      Visit Date: 2018  Attendance: 5/  Subjective % Improvement: 18%  Recert Date: 18  MD appointment: 4/10/18    Visit Dx:    ICD-10-CM ICD-9-CM   1. Chronic pain of left knee M25.562 719.46    G89.29 338.29   2. Total knee replacement status, left Z96.652 V43.65       Patient Active Problem List   Diagnosis   • Allergic angioedema   • Restless leg syndrome   • Neuropathic pain of both legs        Past Medical History:   Diagnosis Date   • Chronic pain disorder    • COPD (chronic obstructive pulmonary disease)    • Hypertension    • Leg pain, bilateral    • Stroke     tia        Past Surgical History:   Procedure Laterality Date   • CAROTID ARTERY ANGIOPLASTY Left    • CHOLECYSTECTOMY     • ENDOSCOPY N/A 2017    Procedure: ESOPHAGOGASTRODUODENOSCOPY;  Surgeon: Sharan Gupta DO;  Location: [facility] ENDOSCOPY;  Service:    • JOINT REPLACEMENT Left 2018    total knee             PT Ortho     Row Name 18 1345       Precautions and Contraindications    Precautions Patient reports \"i dont like electricity in my body\" TKA 3/26/18   -BB       Posture/Observations    Posture/Observations Comments Gait with RW. Decreased knee flexion. 15-20 degree quad lag with SLR.    Patella mobility- mild hypo. No pitting edema   -BB       Left Lower Ext    Lt Knee Extension/Flexion AROM 0-3-55    knee ext improved from -10 to -3   -BB      User Key  (r) = Recorded By, (t) = Taken By, (c) = Cosigned By    Initials Name Provider Type    FELICE Gallego, PT Physical Therapist                            PT Assessment/Plan     Row Name 18 1345          PT Assessment    Assessment Comments Patient made good effort with exercises with pain noted with all exercises. Improved knee flexion seen today from last treatment. MD note faxed to " MD office.   -BB     Patient would benefit from skilled therapy intervention Yes  -BB        PT Plan    PT Frequency 3x/week;2x/week  -BB     Predicted Duration of Therapy Intervention (OT Eval) 6 weeks  -BB     PT Plan Comments Progress ROM as able- check for MD note back.   -BB       User Key  (r) = Recorded By, (t) = Taken By, (c) = Cosigned By    Initials Name Provider Type    BB Uzma Julián, PT Physical Therapist                    Exercises     Row Name 04/09/18 6428             Subjective Comments    Subjective Comments Reports returning CPM machine and reports knee hurts after PT for 1-2 hours and then gets better. Reports him and wife has been laying him on his stomach and bending the knee. Reports doing the stretch with the ball or bolster increases pain to 7-8/10.   -BB         Subjective Pain    Able to rate subjective pain? yes  -BB      Pre-Treatment Pain Level 3  -BB      Post-Treatment Pain Level 4  -BB         Exercise 1    Exercise Name 1 Pro 2 seat 19 rocking   -BB      Time 1 8'  -BB      Additional Comments L1. Patient demonstrated signifiant difficulty throughout with bending knee.   -BB         Exercise 2    Exercise Name 2 Resting stretching on small progressed to large boslter   -BB      Time 2 2'  -BB         Exercise 3    Exercise Name 3 HS with green strap   -BB      Sets 3 2  -BB      Reps 3 10  -BB      Additional Comments second set of ten with PT assist for flexion    53 degrees with strap   -BB         Exercise 4    Exercise Name 4 SLR 2 independent remaining with assist   -BB      Sets 4 2  -BB      Reps 4 10  -BB      Additional Comments fatigues requiring asssit   -BB         Exercise 5    Exercise Name 5 QS with heel prop  -BB      Sets 5 2  -BB      Reps 5 10  -BB      Additional Comments rested  between sets with heel prop present   -BB         Exercise 6    Exercise Name 6 SAQ small bolster AAROM   -BB      Sets 6 2  -BB      Reps 6 10  -BB         Exercise 7    Exercise  "Name 7 Prone gentle contract relax   -BB      Sets 7 1  -BB      Reps 7 10  -BB      Time 7 3\" hold   -BB      Additional Comments Improved to 55 measured in supine   -BB        User Key  (r) = Recorded By, (t) = Taken By, (c) = Cosigned By    Initials Name Provider Type    BB Uzma Gallego, PT Physical Therapist                               PT OP Goals     Row Name 04/09/18 1345          PT Short Term Goals    STG Date to Achieve 04/20/18  -BB     STG 1 LEFS score to be >/= 20/80  -BB     STG 1 Progress Not Met  -BB     STG 2 Note a >/= 25% subjective improvement  -BB     STG 2 Progress Not Met  -BB     STG 3 L knee active extension to -5 degrees or better  -BB     STG 3 Progress Progressing  -BB     STG 3 Progress Comments Initital -10 improved to -3   -BB     STG 4 L knee active flexion to >/= 90 degrees  -BB     STG 4 Progress Not Met  -BB        Long Term Goals    LTG Date to Achieve 05/11/18  -BB     LTG 1 Independent with HEP  -BB     LTG 2 LEFS score to be >/= 60/80  -BB     LTG 3 L knee active extension 0 degrees  -BB     LTG 4 L knee active flexion >/= 120 degrees  -BB     LTG 5 Demonstrate ability to ascend 5 stairs reciprocally.  -BB        Time Calculation    PT Goal Re-Cert Due Date 04/20/18  -BB       User Key  (r) = Recorded By, (t) = Taken By, (c) = Cosigned By    Initials Name Provider Type    FELICE Gallego, PT Physical Therapist                         Time Calculation:   Start Time: 1345  Stop Time: 1455  Time Calculation (min): 70 min  Total Timed Code Minutes- PT: 48 minute(s)    Therapy Charges for Today     Code Description Service Date Service Provider Modifiers Qty    30566189633 HC PT THER PROC EA 15 MIN 4/9/2018 Uzma Gallego, PT GP 3    24203042846 HC PT THER SUPP EA 15 MIN 4/9/2018 Uzma Gallego, PT GP 1                    Uzma Gallego, PT  4/9/2018     "

## 2018-04-11 ENCOUNTER — HOSPITAL ENCOUNTER (OUTPATIENT)
Dept: PHYSICAL THERAPY | Facility: HOSPITAL | Age: 63
Setting detail: THERAPIES SERIES
Discharge: HOME | End: 2018-04-11

## 2018-04-11 DIAGNOSIS — M25.562 CHRONIC PAIN OF LEFT KNEE: Primary | ICD-10-CM

## 2018-04-11 DIAGNOSIS — Z96.652 TOTAL KNEE REPLACEMENT STATUS, LEFT: ICD-10-CM

## 2018-04-11 DIAGNOSIS — G89.29 CHRONIC PAIN OF LEFT KNEE: Primary | ICD-10-CM

## 2018-04-11 PROCEDURE — 97110 THERAPEUTIC EXERCISES: CPT | Performed by: PHYSICAL THERAPIST

## 2018-04-11 NOTE — THERAPY TREATMENT NOTE
Outpatient Physical Therapy Ortho Treatment Note  [facility]     Patient Name: Heber Houston  : 1955  MRN: [id number]  Today's Date: 2018      Visit Date: 2018  Attendance:  (no visit limit)  Subjective Improvement: not rated this date  Next MD Appt: 1 month  Recert Date: 18     Therapy Diagnosis: L TKA 3/26/18     Visit Dx:    ICD-10-CM ICD-9-CM   1. Chronic pain of left knee M25.562 719.46    G89.29 338.29   2. Total knee replacement status, left Z96.652 V43.65            Past Medical History:   Diagnosis Date   • Chronic pain disorder    • COPD (chronic obstructive pulmonary disease)    • Hypertension    • Leg pain, bilateral    • Stroke     tia        Past Surgical History:   Procedure Laterality Date   • CAROTID ARTERY ANGIOPLASTY Left    • CHOLECYSTECTOMY     • ENDOSCOPY N/A 2017    Procedure: ESOPHAGOGASTRODUODENOSCOPY;  Surgeon: Sharan Gupta DO;  Location: [facility] ENDOSCOPY;  Service:    • JOINT REPLACEMENT Left 2018    total knee             PT Ortho     Row Name 18 1500       Posture/Observations    Posture/Observations Comments Ambulating with STC. On Pro2, tends to twist hips to get away from stretch.  -SS       Left Lower Ext    Lt Knee Extension/Flexion AROM 0-3-63; flexion measured after ROM exercises  -      User Key  (r) = Recorded By, (t) = Taken By, (c) = Cosigned By    Initials Name Provider Type    SS Pablo Dickey, PT DPT Physical Therapist                            PT Assessment/Plan     Row Name 18 1500          PT Assessment    Functional Limitations Impaired gait;Limitation in home management;Limitations in community activities;Limitations in functional capacity and performance;Performance in leisure activities;Performance in self-care ADL;Performance in work activities  -     Impairments Edema;Endurance;Gait;Range of motion;Muscle strength;Pain;Integumentary integrity  -     Assessment Comments  "Continued pain with ROM activities. Spent time instructing patient to perform long duration slight to moderately painful stretching versus short-duration high pain stretches. Slowly progressing.  -SS     Rehab Potential Excellent   barrier: pain avoidance behavior noted on eval  -SS     Patient/caregiver participated in establishment of treatment plan and goals Yes  -SS     Patient would benefit from skilled therapy intervention Yes  -SS        PT Plan    Predicted Duration of Therapy Intervention (PT Eval) 3/wk x 3-4 weeks followed by 2/wk x 2-3 weeks  -SS     PT Plan Comments Progressive ROM. Continued POC.  -SS       User Key  (r) = Recorded By, (t) = Taken By, (c) = Cosigned By    Initials Name Provider Type    SS Pablo Dickey, PT DPT Physical Therapist                    Exercises     Row Name 04/11/18 1500             Subjective Comments    Subjective Comments Drove to the clinic today. Saw PA at Dr. Schrader's office yesterday. Staples were removed. Reports that they want  110 deg flexion in 1 month. Reports that he was told to work on moving and bending it. Strength when raising leg up seems to be improved.   -SS         Subjective Pain    Able to rate subjective pain? yes  -SS      Pre-Treatment Pain Level 7  -SS      Post-Treatment Pain Level 3   \"numb\"  -SS         Exercise 1    Exercise Name 1 Pro2, Seat 17, ROM  -SS      Cueing 1 Verbal  -SS      Time 1 12 mins  -SS      Additional Comments Level 1. 10 sec hold when flexed  -SS         Exercise 2    Exercise Name 2 LLPS knee flexion with bolster  -SS      Time 2 3 mins  -SS         Exercise 3    Exercise Name 3 Heel slide with strap  -SS      Cueing 3 Verbal  -SS      Sets 3 1  -SS      Reps 3 10  -SS      Time 3 10-15 sec hold  -SS         Exercise 4    Exercise Name 4 Active heel slide  -SS      Cueing 4 Verbal  -SS      Reps 4 10  -SS         Exercise 5    Exercise Name 5 Assisted SLR  -SS      Cueing 5 --   verbal, tactile  -SS      Sets 5 1  " -SS      Reps 5 10  -SS      Time 5 10 sec hold  -SS         Exercise 6    Exercise Name 6 SAQ large bolster  -SS      Cueing 6 Verbal  -SS      Sets 6 1  -SS      Reps 6 10  -SS      Time 6 5 sec hold  -SS        User Key  (r) = Recorded By, (t) = Taken By, (c) = Cosigned By    Initials Name Provider Type    SS Pablo Dickey, PT DPT Physical Therapist                               PT OP Goals     Row Name 04/11/18 1500          PT Short Term Goals    STG Date to Achieve 04/20/18  -SS     STG 1 LEFS score to be >/= 20/80  -SS     STG 1 Progress Not Met  -SS     STG 2 Note a >/= 25% subjective improvement  -SS     STG 2 Progress Not Met  -SS     STG 3 L knee active extension to -5 degrees or better  -SS     STG 3 Progress Met  -SS     STG 4 L knee active flexion to >/= 90 degrees  -SS     STG 4 Progress Not Met  -SS        Long Term Goals    LTG Date to Achieve 05/11/18  -SS     LTG 1 Independent with HEP  -SS     LTG 2 LEFS score to be >/= 60/80  -SS     LTG 3 L knee active extension 0 degrees  -SS     LTG 4 L knee active flexion >/= 120 degrees  -SS     LTG 5 Demonstrate ability to ascend 5 stairs reciprocally.  -SS        Time Calculation    PT Goal Re-Cert Due Date 04/20/18  -       User Key  (r) = Recorded By, (t) = Taken By, (c) = Cosigned By    Initials Name Provider Type     Pablo Dickey, PT DPT Physical Therapist          Therapy Education  Given: HEP  Program: Reinforced  How Provided: Verbal  Provided to: Patient  Level of Understanding: Verbalized              Time Calculation:   Start Time: 1513  Stop Time: 1612  Time Calculation (min): 59 min    Therapy Charges for Today     Code Description Service Date Service Provider Modifiers Qty    77637677605 HC PT THER PROC EA 15 MIN 4/11/2018 Pablo Dickey, PT DPT GP 3    73791676525 HC PT THER SUPP EA 15 MIN 4/11/2018 Pablo Dickey, PT DPT GP 1                    Pbalo Dickey, PT, DPT, CHT  4/11/2018

## 2018-04-13 ENCOUNTER — HOSPITAL ENCOUNTER (OUTPATIENT)
Dept: PHYSICAL THERAPY | Facility: HOSPITAL | Age: 63
Setting detail: THERAPIES SERIES
Discharge: HOME | End: 2018-04-13

## 2018-04-13 DIAGNOSIS — G89.29 CHRONIC PAIN OF LEFT KNEE: Primary | ICD-10-CM

## 2018-04-13 DIAGNOSIS — M25.562 CHRONIC PAIN OF LEFT KNEE: Primary | ICD-10-CM

## 2018-04-13 DIAGNOSIS — Z96.652 TOTAL KNEE REPLACEMENT STATUS, LEFT: ICD-10-CM

## 2018-04-13 PROCEDURE — 97110 THERAPEUTIC EXERCISES: CPT | Performed by: PHYSICAL THERAPIST

## 2018-04-13 NOTE — THERAPY TREATMENT NOTE
Outpatient Physical Therapy Ortho Treatment Note  [facility]     Patient Name: Heber Houston  : 1955  MRN: [id number]  Today's Date: 2018      Visit Date: 2018  Attendance:  (no visit limit)  Subjective Improvement: 18-20%  Next MD Appt: 1 month  Recert Date: 18     Therapy Diagnosis: L TKA 3/26/18     Visit Dx:    ICD-10-CM ICD-9-CM   1. Chronic pain of left knee M25.562 719.46    G89.29 338.29   2. Total knee replacement status, left Z96.652 V43.65       Patient Active Problem List   Diagnosis   • Allergic angioedema   • Restless leg syndrome   • Neuropathic pain of both legs        Past Medical History:   Diagnosis Date   • Chronic pain disorder    • COPD (chronic obstructive pulmonary disease)    • Hypertension    • Leg pain, bilateral    • Stroke     tia        Past Surgical History:   Procedure Laterality Date   • CAROTID ARTERY ANGIOPLASTY Left    • CHOLECYSTECTOMY     • ENDOSCOPY N/A 2017    Procedure: ESOPHAGOGASTRODUODENOSCOPY;  Surgeon: Sharan Gupta DO;  Location: [facility] ENDOSCOPY;  Service:    • JOINT REPLACEMENT Left 2018    total knee             PT Ortho     Row Name 18 1400       Subjective Comments    Subjective Comments Pain stays at about 3/10. Hurts worse when exercises.   -SS       Precautions and Contraindications    Precautions TKA 3/26/18  -SS       Subjective Pain    Able to rate subjective pain? yes  -SS    Pre-Treatment Pain Level 3  -SS       Posture/Observations    Posture/Observations Comments Ambulating with STC. On Pro2, tends to twist hips to get away from stretch.  -SS       Left Lower Ext    Lt Knee Extension/Flexion AROM 0-6-63 after Pro2;  -SS    Row Name 18 1500       Posture/Observations    Posture/Observations Comments Ambulating with STC. On Pro2, tends to twist hips to get away from stretch.  -SS       Left Lower Ext    Lt Knee Extension/Flexion AROM 0-3-63; flexion measured after ROM exercises   -SS      User Key  (r) = Recorded By, (t) = Taken By, (c) = Cosigned By    Initials Name Provider Type     Pablo Dickey, PT DPT Physical Therapist                            PT Assessment/Plan     Row Name 04/13/18 1400          PT Assessment    Functional Limitations Impaired gait;Limitation in home management;Limitations in community activities;Limitations in functional capacity and performance;Performance in leisure activities;Performance in self-care ADL;Performance in work activities  -     Impairments Edema;Endurance;Gait;Range of motion;Muscle strength;Pain;Integumentary integrity  -     Assessment Comments Improving flexion. Need to monitor extension. Pain with end-range flexion and SLR  -SS     Rehab Potential Good   barrier: pain avoidance behavior noted on eval  -SS     Patient/caregiver participated in establishment of treatment plan and goals Yes  -SS     Patient would benefit from skilled therapy intervention Yes  -SS        PT Plan    Predicted Duration of Therapy Intervention (PT Eval) 3/wk x 3-4 weeks then 2/wk x 2-3 weeks  -SS     PT Plan Comments Start with heat. Continue to progress flexion and extension.   -SS       User Key  (r) = Recorded By, (t) = Taken By, (c) = Cosigned By    Initials Name Provider Type     Pablo Dickey, PT DPT Physical Therapist                Modalities     Row Name 04/13/18 1400             Moist Heat    MH Applied Yes  -SS      Location L knee anterior and posterior  -SS      Rx Minutes 10 mins  -SS         Ice    Ice Applied Yes  -SS      Location L knee  -SS      Rx Minutes Other:   20 mins  -SS      Ice Prior to Rx No  -SS      Ice S/P Rx Yes  -SS        User Key  (r) = Recorded By, (t) = Taken By, (c) = Cosigned By    Initials Name Provider Type     Pablo Dickey, PT DPT Physical Therapist                Exercises     Row Name 04/13/18 1400             Subjective Comments    Subjective Comments Pain stays at about 3/10. Hurts  worse when exercises.   -SS         Subjective Pain    Able to rate subjective pain? yes  -SS      Pre-Treatment Pain Level 3  -SS      Post-Treatment Pain Level 4  -SS         Exercise 1    Exercise Name 1 Pro2, Seat 17, ROM  -SS      Cueing 1 Verbal  -SS      Time 1 15 mins  -SS      Additional Comments Level 1; 10 sec hold when flexed  -SS         Exercise 2    Exercise Name 2 MHP -- see Modalities  -SS         Exercise 3    Exercise Name 3 Heel slide with strap  -SS      Cueing 3 Verbal  -SS      Sets 3 1  -SS      Reps 3 15  -SS      Time 3 10-15 sec hold  -SS         Exercise 4    Exercise Name 4 Seated knee flexion hang off edge of table  -SS      Cueing 4 Verbal;Demo  -SS      Sets 4 3  -SS      Time 4 30 sec hold  -SS         Exercise 5    Exercise Name 5 SLR  -SS      Cueing 5 Verbal  -SS      Sets 5 2  -SS      Reps 5 10  -SS      Time 5 3 sec hold  -SS        User Key  (r) = Recorded By, (t) = Taken By, (c) = Cosigned By    Initials Name Provider Type     Pablo Dickey, PT DPT Physical Therapist                               PT OP Goals     Row Name 04/13/18 1400          PT Short Term Goals    STG Date to Achieve 04/20/18  -SS     STG 1 LEFS score to be >/= 20/80  -SS     STG 1 Progress Not Met  -SS     STG 2 Note a >/= 25% subjective improvement  -SS     STG 2 Progress Not Met  -SS     STG 3 L knee active extension to -5 degrees or better  -SS     STG 3 Progress Met  -SS     STG 4 L knee active flexion to >/= 90 degrees  -     STG 4 Progress Not Met  -SS        Long Term Goals    LTG Date to Achieve 05/11/18  -     LTG 1 Independent with HEP  -SS     LTG 2 LEFS score to be >/= 60/80  -SS     LTG 3 L knee active extension 0 degrees  -SS     LTG 4 L knee active flexion >/= 120 degrees  -     LTG 5 Demonstrate ability to ascend 5 stairs reciprocally.  -SS        Time Calculation    PT Goal Re-Cert Due Date 04/20/18  -       User Key  (r) = Recorded By, (t) = Taken By, (c) = Cosigned By     Initials Name Provider Type    SS Pablo Dickey, PT DPT Physical Therapist          Therapy Education  Given: HEP  Program: Modified (added heat pre-stretch)  How Provided: Verbal  Provided to: Patient  Level of Understanding: Verbalized              Time Calculation:   Start Time: 1420  Stop Time: 1530  Time Calculation (min): 70 min    Therapy Charges for Today     Code Description Service Date Service Provider Modifiers Qty    89084301893 HC PT THER PROC EA 15 MIN 4/13/2018 Pablo Dickey, PT DPT GP 3    63434952337 HC PT THER SUPP EA 15 MIN 4/13/2018 Pablo Dickey, PT DPT GP 1                    Pablo Dickey, PT, DPT, CHT  4/13/2018

## 2018-04-16 ENCOUNTER — HOSPITAL ENCOUNTER (OUTPATIENT)
Dept: PHYSICAL THERAPY | Facility: HOSPITAL | Age: 63
Setting detail: THERAPIES SERIES
Discharge: HOME | End: 2018-04-16

## 2018-04-16 DIAGNOSIS — G89.29 CHRONIC PAIN OF LEFT KNEE: Primary | ICD-10-CM

## 2018-04-16 DIAGNOSIS — Z96.652 TOTAL KNEE REPLACEMENT STATUS, LEFT: ICD-10-CM

## 2018-04-16 DIAGNOSIS — M25.562 CHRONIC PAIN OF LEFT KNEE: Primary | ICD-10-CM

## 2018-04-16 PROCEDURE — 97110 THERAPEUTIC EXERCISES: CPT

## 2018-04-16 NOTE — THERAPY TREATMENT NOTE
Outpatient Physical Therapy Ortho Treatment Note  [facility]     Patient Name: Heber Houston  : 1955  MRN: [id number]  Today's Date: 2018      Visit Date: 2018     Sub imp 18-20%  Visit  (NL)  MD 1 month  Re 18    Visit Dx:    ICD-10-CM ICD-9-CM   1. Chronic pain of left knee M25.562 719.46    G89.29 338.29   2. Total knee replacement status, left Z96.652 V43.65       Patient Active Problem List   Diagnosis   • Allergic angioedema   • Restless leg syndrome   • Neuropathic pain of both legs        Past Medical History:   Diagnosis Date   • Chronic pain disorder    • COPD (chronic obstructive pulmonary disease)    • Hypertension    • Leg pain, bilateral    • Stroke     tia        Past Surgical History:   Procedure Laterality Date   • CAROTID ARTERY ANGIOPLASTY Left    • CHOLECYSTECTOMY     • ENDOSCOPY N/A 2017    Procedure: ESOPHAGOGASTRODUODENOSCOPY;  Surgeon: Sharan Gupta DO;  Location: [facility] ENDOSCOPY;  Service:    • JOINT REPLACEMENT Left 2018    total knee             PT Ortho     Row Name 18 0800       Posture/Observations    Posture/Observations Comments (P)  Amb with STC, antalgic  -LORI       Left Lower Ext    Lt Knee Extension/Flexion AROM (P)  0-3-67 post   -LORI    Row Name 18 1400       Subjective Comments    Subjective Comments Pain stays at about 3/10. Hurts worse when exercises.   -SS       Precautions and Contraindications    Precautions TKA 3/26/18  -SS       Subjective Pain    Able to rate subjective pain? yes  -SS    Pre-Treatment Pain Level 3  -SS       Posture/Observations    Posture/Observations Comments Ambulating with STC. On Pro2, tends to twist hips to get away from stretch.  -SS       Left Lower Ext    Lt Knee Extension/Flexion AROM 0-6-63 after Pro2;  -SS      User Key  (r) = Recorded By, (t) = Taken By, (c) = Cosigned By    Initials Name Provider Type    SS Pablo Dickey, PT DPT Physical Therapist    LORI  Calvin Gann, [facility]                             PT Assessment/Plan     Row Name 04/16/18 0959          PT Assessment    Assessment Comments (P)  Completed rx. ROM stayed consistant over week end. Cont pain with ex.   -LORI        PT Plan    PT Frequency (P)  3x/week;2x/week  -LORI     Predicted Duration of Therapy Intervention (OT Eval) (P)  3 X 3-4weeks, 2 X 2-3 weeks  -LORI     PT Plan Comments (P)  cont per POC. MHP, progress flexion and ext as tolerates. Pt to miss Wed. as had another MD apt. (non knee)  -LORI       User Key  (r) = Recorded By, (t) = Taken By, (c) = Cosigned By    Initials Name Provider Type    LORI Calvin Gann, [facility]                 Modalities     Row Name 04/16/18 0800             Moist Heat    MH Applied (P)  Yes  -LORI      Location (P)  L knee anterior and posterior  -LORI      Rx Minutes (P)  10 mins  -LORI         Ice    Location (P)  L knee  -LORI      Rx Minutes (P)  Other:   20 mins  -LORI      Ice Prior to Rx (P)  No  -LORI      Ice S/P Rx (P)  Yes  -LORI        User Key  (r) = Recorded By, (t) = Taken By, (c) = Cosigned By    Initials Name Provider Type    LORI Calvin Gann, [facility]                 Exercises     Row Name 04/16/18 0800             Subjective Comments    Subjective Comments (P)  Reports pain is up today. Has not had pain meds in a day or so. Reports will miss apt on Wed., because of a doctors apt. (not knee)  -LORI         Subjective Pain    Able to rate subjective pain? (P)  yes  -LORI      Pre-Treatment Pain Level (P)  5  -LORI      Post-Treatment Pain Level (P)  2  -LORI         Exercise 1    Exercise Name 1 (P)  Pro2, Seat 17, ROM  -LORI      Cueing 1 (P)  Verbal  -LORI      Time 1 (P)  15 mins  -LORI      Additional Comments (P)  L1 10sec hold  -LORI         Exercise 2    Exercise Name 2 (P)  MHP -- see Modalities  -LORI         Exercise 3    Exercise Name 3 (P)  Heel slide with strap  -LORI      Cueing 3 (P)  Verbal  -LORI      Sets 3 (P)  1  -LORI      Reps 3 (P)  25   -LORI      Time 3 (P)  10-15 sec hold  -LORI         Exercise 4    Exercise Name 4 (P)  Seated knee flexion hang off edge of table w/ overpressure  -LORI      Cueing 4 (P)  Verbal;Demo  -LORI      Sets 4 (P)  --  -LORI      Time 4 (P)  7  -LORI         Exercise 5    Exercise Name 5 (P)  SLR  -LORI      Cueing 5 (P)  Verbal  -LORI      Sets 5 (P)  2  -LORI      Reps 5 (P)  10  -LORI      Time 5 (P)  3 sec hold  -LORI         Exercise 6    Exercise Name 6 (P)  Ext prop  -LORI      Time 6 (P)  5  -LORI        User Key  (r) = Recorded By, (t) = Taken By, (c) = Cosigned By    Initials Name Provider Type    LORI Gann ATC                                PT OP Goals     Row Name 04/16/18 0800          PT Short Term Goals    STG Date to Achieve (P)  04/20/18  -LORI     STG 1 (P)  LEFS score to be >/= 20/80  -LORI     STG 1 Progress (P)  Not Met  -LORI     STG 2 (P)  Note a >/= 25% subjective improvement  -LORI     STG 2 Progress (P)  Not Met  -LORI     STG 3 (P)  L knee active extension to -5 degrees or better  -LORI     STG 3 Progress (P)  Met  -LORI     STG 4 (P)  L knee active flexion to >/= 90 degrees  -LORI     STG 4 Progress (P)  Not Met  -LORI        Long Term Goals    LTG Date to Achieve (P)  05/11/18  -LORI     LTG 1 (P)  Independent with HEP  -LORI     LTG 2 (P)  LEFS score to be >/= 60/80  -LORI     LTG 3 (P)  L knee active extension 0 degrees  -LORI     LTG 4 (P)  L knee active flexion >/= 120 degrees  -LORI     LTG 5 (P)  Demonstrate ability to ascend 5 stairs reciprocally.  -LORI       User Key  (r) = Recorded By, (t) = Taken By, (c) = Cosigned By    Initials Name Provider Type    LORI Gann ATC           Therapy Education  Given: (P) HEP  Program: (P) Reinforced  How Provided: (P) Verbal  Provided to: (P) Patient  Level of Understanding: (P) Verbalized              Time Calculation:   Start Time: (P) 0835  Stop Time: (P) 0955  Time Calculation (min): (P) 80 min  Total Timed Code Minutes- PT: (P) 60 minute(s)    Therapy  Charges for Today     Code Description Service Date Service Provider Modifiers Qty    11936811482 HC PT THER SUPP EA 15 MIN 4/16/2018 Calvin Gann ATC  2    59147490022 HC PT THER PROC EA 15 MIN 4/16/2018 Calvin Gann ATC  3                    Calvin Gann ATC  4/16/2018

## 2018-04-18 ENCOUNTER — APPOINTMENT (OUTPATIENT)
Dept: PHYSICAL THERAPY | Facility: HOSPITAL | Age: 63
End: 2018-04-18

## 2018-04-20 ENCOUNTER — HOSPITAL ENCOUNTER (OUTPATIENT)
Dept: PHYSICAL THERAPY | Facility: HOSPITAL | Age: 63
Setting detail: THERAPIES SERIES
Discharge: HOME | End: 2018-04-20

## 2018-04-20 DIAGNOSIS — G89.29 CHRONIC PAIN OF LEFT KNEE: Primary | ICD-10-CM

## 2018-04-20 DIAGNOSIS — M25.562 CHRONIC PAIN OF LEFT KNEE: Primary | ICD-10-CM

## 2018-04-20 DIAGNOSIS — Z96.652 TOTAL KNEE REPLACEMENT STATUS, LEFT: ICD-10-CM

## 2018-04-20 PROCEDURE — 97110 THERAPEUTIC EXERCISES: CPT

## 2018-04-20 NOTE — THERAPY TREATMENT NOTE
Outpatient Physical Therapy Ortho Treatment Note  [facility]   Myriam Dudley       Patient Name: Heber Houston  : 1955  MRN: [id number]  Today's Date: 2018      Visit Date: 2018   Pt reports 3/10 pain pre treatment, 3/10 pain post treatment  Reports 20% of improvement.  Attended 9/9 visits.  Insurance available: no limit   Next MD appt: May 2018.  Recertification: 2018.    Visit Dx:    ICD-10-CM ICD-9-CM   1. Chronic pain of left knee M25.562 719.46    G89.29 338.29   2. Total knee replacement status, left Z96.652 V43.65       Patient Active Problem List   Diagnosis   • Allergic angioedema   • Restless leg syndrome   • Neuropathic pain of both legs        Past Medical History:   Diagnosis Date   • Chronic pain disorder    • COPD (chronic obstructive pulmonary disease)    • Hypertension    • Leg pain, bilateral    • Stroke     tia        Past Surgical History:   Procedure Laterality Date   • CAROTID ARTERY ANGIOPLASTY Left    • CHOLECYSTECTOMY     • ENDOSCOPY N/A 2017    Procedure: ESOPHAGOGASTRODUODENOSCOPY;  Surgeon: Sharan Gupta DO;  Location: [facility] ENDOSCOPY;  Service:    • JOINT REPLACEMENT Left 2018    total knee             PT Ortho     Row Name 18 0900       Posture/Observations    Posture/Observations Comments (P)  Amb with STC, antalgic  -MC       Left Lower Ext    Lt Knee Extension/Flexion AROM (P)  0-7-67 flexion after heel slides, ext after QS  -MC      User Key  (r) = Recorded By, (t) = Taken By, (c) = Cosigned By    Initials Name Provider Type     Myriam Dudley                             PT Assessment/Plan     Row Name 18 0900          PT Assessment    Assessment Comments (P)  Patient expressed pain throughout therex. External hip rotation with PRO II ROM to avoid pain.   -MC        PT Plan    PT Frequency (P)  2x/week;3x/week  -     Predicted Duration of Therapy Intervention (OT Eval) (P)  3x 3-4  "weeks   -MC     PT Plan Comments (P)  Continue to progress flexion and ext as tolerated per POC. MHP.   -MC       User Key  (r) = Recorded By, (t) = Taken By, (c) = Cosigned By    Initials Name Provider Type    MILAGROS BERNAL TBS                 Modalities     Row Name 04/20/18 0900             Moist Heat    MH Applied (P)  Yes  -MC      Location (P)  L knee anterior   -MC      Rx Minutes (P)  10 mins  -MC      MH Prior to Rx (P)  Yes  -MC         Ice    Ice Applied (P)  Yes  -MC      Location (P)  L knee  -MC      Rx Minutes (P)  Other:   20 mins  -MC      Ice Prior to Rx (P)  No  -MC      Ice S/P Rx (P)  Yes  -MC        User Key  (r) = Recorded By, (t) = Taken By, (c) = Cosigned By    Initials Name Provider Type    MILAGROS BERNAL TBS                 Exercises     Row Name 04/20/18 0900             Subjective Comments    Subjective Comments (P)  Pt reports that pain is \"about usual\" today. When asked about compliance with HEP patient states \"I'm lazy\". He reports occasionally performing some ROM exercises some days.  -MC         Subjective Pain    Able to rate subjective pain? (P)  yes  -MC      Pre-Treatment Pain Level (P)  3  -MC      Post-Treatment Pain Level (P)  3  -MC         Exercise 1    Exercise Name 1 (P)  P--see modalities   -         Exercise 2    Exercise Name 2 (P)  PRO II. Seat 17, ROM  -MC      Time 2 (P)  15 mins   -MC      Additional Comments (P)  L1, 10 sec hold    external hip rotation noted in attempt to avoid pain  -MC         Exercise 3    Exercise Name 3 (P)  Seated knee flexion hang off table w/ overpressure  -      Sets 3 (P)  5  -MC      Reps 3 (P)  --  -MC      Time 3 (P)  15 sec hold   -MC         Exercise 4    Exercise Name 4 (P)  Heel slide w/ green strap   -      Sets 4 (P)  1  -MC      Reps 4 (P)  25  -MC      Time 4 (P)  10-15 sec hold   -MC         Exercise 5    Exercise Name 5 (P)  Seated knee flexion hang off table w/ overpressure  -MC   "    Sets 5 (P)  5  -MC      Time 5 (P)  15 sec hold   -MC         Exercise 6    Exercise Name 6 (P)  SLR   -MC      Sets 6 (P)  2  -MC      Reps 6 (P)  10  -MC      Time 6 (P)  3 sec hold   -MC         Exercise 7    Exercise Name 7 (P)  QS  -MC      Sets 7 (P)  1  -MC      Reps 7 (P)  10  -MC      Time 7 (P)  3 sec hold   -MC         Exercise 8    Exercise Name 8 (P)  Ext prop  -MC      Time 8 (P)  5 min  -        User Key  (r) = Recorded By, (t) = Taken By, (c) = Cosigned By    Initials Name Provider Type    MILAGROS Dudley                                PT OP Goals     Row Name 04/20/18 0900          PT Short Term Goals    STG Date to Achieve (P)  04/20/18  -     STG 1 (P)  LEFS score to be >/= 20/80  -     STG 1 Progress (P)  Not Met  -     STG 2 (P)  Note a >/= 25% subjective improvement  -     STG 2 Progress (P)  Not Met  -     STG 3 (P)  L knee active extension to -5 degrees or better  -     STG 3 Progress (P)  Met  -     STG 4 (P)  L knee active flexion to >/= 90 degrees  -     STG 4 Progress (P)  Not Met  -        Long Term Goals    LTG Date to Achieve (P)  05/11/18  -     LTG 1 (P)  Independent with HEP  -     LTG 2 (P)  LEFS score to be >/= 60/80  -     LTG 3 (P)  L knee active extension 0 degrees  -     LTG 4 (P)  L knee active flexion >/= 120 degrees  -     LTG 5 (P)  Demonstrate ability to ascend 5 stairs reciprocally.  -       User Key  (r) = Recorded By, (t) = Taken By, (c) = Cosigned By    Initials Name Provider Type    MILAGROS Dudley           Therapy Education  Given: (P) HEP  Program: (P) Reinforced  How Provided: (P) Verbal  Provided to: (P) Patient  Level of Understanding: (P) Verbalized              Time Calculation:   Start Time: (P) 0858  Stop Time: (P) 1018  Time Calculation (min): (P) 80 min    Therapy Charges for Today     Code Description Service Date Service Provider Modifiers Qty    46040505537 HC PT THER PROC EA 15  MIN 4/20/2018 Myriam Dudley GP 3    96182760975 HC PT THER SUPP EA 15 MIN 4/20/2018 Myriam Dudley GP 2                    Myriam Dudley  4/20/2018

## 2018-04-23 ENCOUNTER — HOSPITAL ENCOUNTER (OUTPATIENT)
Dept: PHYSICAL THERAPY | Facility: HOSPITAL | Age: 63
Setting detail: THERAPIES SERIES
Discharge: HOME | End: 2018-04-23

## 2018-04-23 DIAGNOSIS — Z96.652 TOTAL KNEE REPLACEMENT STATUS, LEFT: ICD-10-CM

## 2018-04-23 DIAGNOSIS — G89.29 CHRONIC PAIN OF LEFT KNEE: Primary | ICD-10-CM

## 2018-04-23 DIAGNOSIS — M25.562 CHRONIC PAIN OF LEFT KNEE: Primary | ICD-10-CM

## 2018-04-23 PROCEDURE — 97110 THERAPEUTIC EXERCISES: CPT | Performed by: PHYSICAL THERAPIST

## 2018-04-23 NOTE — THERAPY PROGRESS REPORT/RE-CERT
"    Outpatient Physical Therapy Ortho Progress Note  [facility]     Patient Name: Heber Houston  : 1955  MRN: [id number]  Today's Date: 2018      Visit Date: 2018  Attendance: 10/10 (no visit limit)  Subjective Improvement: 35-40%  Next MD Appt: May 2018  Recert Date: 18    Therapy Diagnosis: L TKA 3/26/18    Changes in Medications: atorvastatin added to medication list  Changes in MD Orders: none noted  Number of Work Days Lost: since surgery         Past Medical History:   Diagnosis Date   • Chronic pain disorder    • COPD (chronic obstructive pulmonary disease)    • Hypertension    • Leg pain, bilateral    • Stroke     tia        Past Surgical History:   Procedure Laterality Date   • CAROTID ARTERY ANGIOPLASTY Left    • CHOLECYSTECTOMY     • ENDOSCOPY N/A 2017    Procedure: ESOPHAGOGASTRODUODENOSCOPY;  Surgeon: Sharan Gupta DO;  Location: [facility] ENDOSCOPY;  Service:    • JOINT REPLACEMENT Left 2018    total knee       Visit Dx:     ICD-10-CM ICD-9-CM   1. Chronic pain of left knee M25.562 719.46    G89.29 338.29   2. Total knee replacement status, left Z96.652 V43.65                 PT Ortho     Row Name 18 0800       Subjective Comments    Subjective Comments Atorvastatin added to patient's medication list. Some of the exercises makes knee more painful while he's doing them. \"I don't feel like I'm 50% better than I was. I always feel better when I leave her a little bit.\" 35-40% subjective improvement. Reports that he is not as compliant with HEP as he could be.   -SS       Precautions and Contraindications    Precautions TKA 3/26/18  -SS       Posture/Observations    Posture/Observations Comments Presents ambulating with STC. Ambulates in clinic without assistive device. Stiff L knee gait with and without assistive device.   -SS       Left Lower Ext    Lt Knee Extension/Flexion AROM 0-5-68 after stretches; 71 degrees AA flexion with strap  -SS    "    Lower Extremity (Manual Muscle Testing)    Comment, MMT: Lower Extremity 17 degree extension lag with SLR  -SS      User Key  (r) = Recorded By, (t) = Taken By, (c) = Cosigned By    Initials Name Provider Type    LINA Dickey, PT DPT Physical Therapist                      Therapy Education  Given: HEP  Program: Reinforced  How Provided: Verbal  Provided to: Patient  Level of Understanding: Verbalized           PT OP Goals     Row Name 04/23/18 0800          PT Short Term Goals    STG Date to Achieve 05/14/18  -SS     STG 1 LEFS score to be >/= 20/80  -SS     STG 1 Progress Met  -SS     STG 2 Note a >/= 25% subjective improvement  -SS     STG 2 Progress Met  -SS     STG 3 L knee active extension to -5 degrees or better  -SS     STG 3 Progress Not Met  -SS     STG 3 Progress Comments met previously but not today  -     STG 4 L knee active flexion to >/= 90 degrees  -SS     STG 4 Progress Not Met  -SS        Long Term Goals    LTG Date to Achieve 06/04/18  -     LTG 1 Independent with HEP  -SS     LTG 1 Progress Ongoing  -SS     LTG 2 LEFS score to be >/= 60/80  -SS     LTG 2 Progress Ongoing  -SS     LTG 3 L knee active extension 0 degrees  -SS     LTG 3 Progress Ongoing  -SS     LTG 4 L knee active flexion >/= 120 degrees  -     LTG 4 Progress Ongoing  -SS     LTG 5 Demonstrate ability to ascend 5 stairs reciprocally.  -     LTG 5 Progress Ongoing  -SS        Time Calculation    PT Goal Re-Cert Due Date 05/14/18  -       User Key  (r) = Recorded By, (t) = Taken By, (c) = Cosigned By    Initials Name Provider Type    LINA Dickey, PT DPT Physical Therapist                PT Assessment/Plan     Row Name 04/23/18 0900          PT Assessment    Functional Limitations Impaired gait;Limitation in home management;Limitations in community activities;Limitations in functional capacity and performance;Performance in leisure activities;Performance in self-care ADL;Performance in work  "activities  -SS     Impairments Edema;Endurance;Gait;Range of motion;Muscle strength;Pain;Integumentary integrity  -SS     Assessment Comments I have emphasized once again the need for compliance with HEP. Patient has pedals at home that he uses, but he focuses on being able to do circles instead of increasing motion. Instructed patient that amount of angular motion in the joint is more important than completing revolutions on pedals.  -SS     Rehab Potential Good   barrier: question HEP compliance and pain avoidance behavior  -SS     Patient/caregiver participated in establishment of treatment plan and goals Yes  -SS     Patient would benefit from skilled therapy intervention Yes  -SS        PT Plan    PT Frequency 3x/week  -SS     Predicted Duration of Therapy Intervention (PT Eval) 3-4 weeks with further TBA  -SS     PT Plan Comments A/AA/PROM, stretching, strengthening, gait and balance training, ice  -SS       User Key  (r) = Recorded By, (t) = Taken By, (c) = Cosigned By    Initials Name Provider Type     Pablo Dickey, PT DPT Physical Therapist                Modalities     Row Name 04/23/18 0800             Moist Heat    Location L knee anterior   -SS      Rx Minutes 10 mins  -SS      MH Prior to Rx Yes  -SS        User Key  (r) = Recorded By, (t) = Taken By, (c) = Cosigned By    Initials Name Provider Type     Pablo Dickey, PT DPT Physical Therapist              Exercises     Row Name 04/23/18 0800             Subjective Comments    Subjective Comments Atorvastatin added to patient's medication list. Some of the exercises makes knee more painful while he's doing them. \"I don't feel like I'm 50% better than I was. I always feel better when I leave her a little bit.\" 35-40% subjective improvement. Reports that he is not as compliant with HEP as he could be.   -SS         Subjective Pain    Able to rate subjective pain? yes  -SS      Pre-Treatment Pain Level 3  -SS      Post-Treatment Pain " Level 3  -SS         Exercise 1    Exercise Name 1 MHP--see Modalities   -SS         Exercise 2    Exercise Name 2 Pro2, Seat 16, ROM  -SS      Cueing 2 Verbal  -SS      Time 2 10 mins  -SS      Additional Comments Level 2  -SS         Exercise 3    Exercise Name 3 Incline calf stretch  -SS      Cueing 3 Verbal;Demo  -SS      Sets 3 3  -SS      Time 3 30 sec hold  -SS         Exercise 4    Exercise Name 4 Standing HS stretch  -SS      Cueing 4 Verbal;Demo  -SS      Sets 4 3  -SS      Time 4 30 sec hold  -SS         Exercise 5    Exercise Name 5 SAQ  -SS      Cueing 5 Verbal  -SS      Sets 5 2  -SS      Reps 5 10  -SS      Additional Comments 1# AW  -SS         Exercise 6    Exercise Name 6 LAQ  -SS      Cueing 6 Verbal  -SS      Reps 6 10  -SS      Time 6 1# AW  -SS         Exercise 7    Exercise Name 7 patella mobes - see PT Manual Rx  -SS         Exercise 8    Exercise Name 8 SLR - flexion  -SS      Cueing 8 Verbal  -SS      Sets 8 2  -SS      Reps 8 15  -SS      Time 8 3 sec hold  -SS        User Key  (r) = Recorded By, (t) = Taken By, (c) = Cosigned By    Initials Name Provider Type     Pablo Dickey, PT DPT Physical Therapist           Manual Rx (last 36 hours)      Manual Treatments     Row Name 04/23/18 0800             Manual Rx 1    Manual Rx 1 Location L patella  -SS      Manual Rx 1 Type medial/lateral, superior/inferior joint mobilization  -SS      Manual Rx 1 Grade 2  -SS        User Key  (r) = Recorded By, (t) = Taken By, (c) = Cosigned By    Initials Name Provider Type     Pablo Dickey, PT DPT Physical Therapist                      Outcome Measure Options: Lower Extremity Functional Scale (LEFS)  Lower Extremity Functional Index  Any of your usual work, housework or school activities: A little bit of difficulty  Your usual hobbies, recreational or sporting activities: Moderate difficulty  Getting into or out of the bath: A little bit of difficulty  Walking between rooms: A little  bit of difficulty  Putting on your shoes or socks: Moderate difficulty  Squatting: Moderate difficulty  Lifting an object, like a bag of groceries from the floor: A little bit of difficulty  Performing light activities around your home: A little bit of difficulty  Performing heavy activities around your home: Moderate difficulty  Getting into or out of a car: A little bit of difficulty  Walking 2 blocks: A little bit of difficulty  Walking a mile: A little bit of difficulty  Going up or down 10 stairs (about 1 flight of stairs): A little bit of difficulty  Standing for 1 hour: A little bit of difficulty  Sitting for 1 hour: No difficulty  Running on even ground: Extreme difficulty or unable to perform activity  Running on uneven ground: Extreme difficulty or unable to perform activity  Making sharp turns while running fast: Extreme difficulty or unable to perform activity  Hopping: Extreme difficulty or unable to perform activity  Rolling over in bed: A little bit of difficulty  Total: 45      Time Calculation:   Start Time: 0848  Stop Time: 1015  Time Calculation (min): 87 min     Therapy Charges for Today     Code Description Service Date Service Provider Modifiers Qty    84838393612 HC PT THER PROC EA 15 MIN 4/23/2018 Pablo Dickey, PT DPT GP 4    49634210990 HC PT THER SUPP EA 15 MIN 4/23/2018 Pablo Dickey, PT DPT GP 1                   Pablo Dickey, PT, DPT, CHT  4/23/2018

## 2018-04-25 ENCOUNTER — HOSPITAL ENCOUNTER (OUTPATIENT)
Dept: PHYSICAL THERAPY | Facility: HOSPITAL | Age: 63
Setting detail: THERAPIES SERIES
Discharge: HOME | End: 2018-04-25

## 2018-04-25 DIAGNOSIS — Z96.652 TOTAL KNEE REPLACEMENT STATUS, LEFT: ICD-10-CM

## 2018-04-25 DIAGNOSIS — M25.562 CHRONIC PAIN OF LEFT KNEE: Primary | ICD-10-CM

## 2018-04-25 DIAGNOSIS — G89.29 CHRONIC PAIN OF LEFT KNEE: Primary | ICD-10-CM

## 2018-04-25 PROCEDURE — 97110 THERAPEUTIC EXERCISES: CPT | Performed by: PHYSICAL THERAPIST

## 2018-04-25 NOTE — THERAPY TREATMENT NOTE
"    Outpatient Physical Therapy Ortho Treatment Note  [facility]     Patient Name: Heber Houston  : 1955  MRN: [id number]  Today's Date: 2018      Visit Date: 2018  Attendance:   Subjective % Improvement: 40-45%  Recert Date: 5/15/18  MD appointment: May 2018    Therapy Diagnosis: L TKA 3/26/18    Visit Dx:    ICD-10-CM ICD-9-CM   1. Chronic pain of left knee M25.562 719.46    G89.29 338.29   2. Total knee replacement status, left Z96.652 V43.65       Patient Active Problem List   Diagnosis   • Allergic angioedema   • Restless leg syndrome   • Neuropathic pain of both legs        Past Medical History:   Diagnosis Date   • Chronic pain disorder    • COPD (chronic obstructive pulmonary disease)    • Hypertension    • Leg pain, bilateral    • Stroke     tia        Past Surgical History:   Procedure Laterality Date   • CAROTID ARTERY ANGIOPLASTY Left    • CHOLECYSTECTOMY     • ENDOSCOPY N/A 2017    Procedure: ESOPHAGOGASTRODUODENOSCOPY;  Surgeon: Sharan Gupta DO;  Location: [facility] ENDOSCOPY;  Service:    • JOINT REPLACEMENT Left 2018    total knee             PT Ortho     Row Name 18 0900       Left Lower Ext    Lt Knee Extension/Flexion AROM --  -BB    Row Name 18 0819       Subjective Comments    Subjective Comments Reports feeling like he was set back last week after missing an appointment due to a MD appointment. \"im really lazy at home thats a big problem\" Reports getting a new bike to help with ROM.   -BB       Precautions and Contraindications    Precautions TKA 3/26/18  -BB       Posture/Observations    Posture/Observations Comments Ambulates with stiff knee using SC.   -BB       Left Lower Ext    Lt Knee Extension/Flexion AROM ext: -5, flexion: 70 with strap  -BB    Row Name 18 0800       Subjective Comments    Subjective Comments Atorvastatin added to patient's medication list. Some of the exercises makes knee more painful while he's " "doing them. \"I don't feel like I'm 50% better than I was. I always feel better when I leave her a little bit.\" 35-40% subjective improvement. Reports that he is not as compliant with HEP as he could be.   -SS       Precautions and Contraindications    Precautions TKA 3/26/18  -SS       Posture/Observations    Posture/Observations Comments Presents ambulating with STC. Ambulates in clinic without assistive device. Stiff L knee gait with and without assistive device.   -SS       Left Lower Ext    Lt Knee Extension/Flexion AROM 0-5-68 after stretches; 71 degrees AA flexion with strap  -SS       Lower Extremity (Manual Muscle Testing)    Comment, MMT: Lower Extremity 17 degree extension lag with SLR  -SS      User Key  (r) = Recorded By, (t) = Taken By, (c) = Cosigned By    Initials Name Provider Type    FELICE Gallego, PT Physical Therapist    SS Pablo Dickey, PT DPT Physical Therapist                            PT Assessment/Plan     Row Name 04/25/18 0819          PT Assessment    Assessment Comments Encouraged patient to perform HEP at home 2-3x a day with patient admitting he doesnt do his HEP and knows its held him up. Patient tolerated treatment well today without complaints of pain. Continues to lack significant ROM of the knee. Unable to make full revolutions with Pro 2  -BB     Patient would benefit from skilled therapy intervention Yes  -BB        PT Plan    PT Frequency 3x/week  -BB     Predicted Duration of Therapy Intervention (OT Eval) 3-4 weeks   -BB     PT Plan Comments Continue MHP, and ROM progression   -BB       User Key  (r) = Recorded By, (t) = Taken By, (c) = Cosigned By    Initials Name Provider Type    FELICE Gallego, PT Physical Therapist                Modalities     Row Name 04/25/18 0819             Moist Heat    MH Applied Yes  -BB      Location L knee anterior   -BB      Rx Minutes 10 mins  -BB         Ice    Patient denies application of Ice Yes  -BB        User Key  (r) = " "Recorded By, (t) = Taken By, (c) = Cosigned By    Initials Name Provider Type    BB Uzma Gallego, PT Physical Therapist                Exercises     Row Name 04/25/18 0819             Subjective Comments    Subjective Comments Reports feeling like he was set back last week after missing an appointment due to a MD appointment. \"im really lazy at home thats a big problem\" Reports getting a new bike to help with ROM.   -BB         Subjective Pain    Able to rate subjective pain? yes  -BB      Pre-Treatment Pain Level 3  -BB      Post-Treatment Pain Level 2   \" no pain\", \"just feels tight\"  -BB         Exercise 1    Exercise Name 1 MHP to anterior knee with heel prop   -BB      Time 1 10  -BB         Exercise 2    Exercise Name 2 Pro2, Seat 16, ROM  -BB      Time 2 10'  -BB      Additional Comments Level 1 rocking  -BB         Exercise 3    Exercise Name 3 Incline calf stretch  -BB      Sets 3 3  -BB      Time 3 30 sec hold  -BB         Exercise 4    Exercise Name 4 Standing HS stretch  -BB      Sets 4 3  -BB      Time 4 30 sec hold  -BB         Exercise 5    Exercise Name 5 SAQ  -BB      Sets 5 2  -BB      Reps 5 10  -BB      Additional Comments 1# AW   -BB         Exercise 6    Exercise Name 6 LAQ  -BB      Sets 6 2  -BB      Reps 6 10  -BB      Additional Comments 1# AW   -BB         Exercise 7    Exercise Name 7 Patellar mobes   -BB      Time 7 4'  -BB      Additional Comments tightness superior/inferior   -BB         Exercise 8    Exercise Name 8 SLR   -BB      Sets 8 2  -BB      Reps 8 15  -BB      Time 8 2\" hold   -BB         Exercise 9    Exercise Name 9 Heelslides with strap   -BB      Sets 9 1  -BB      Reps 9 20  -BB        User Key  (r) = Recorded By, (t) = Taken By, (c) = Cosigned By    Initials Name Provider Type    BB Uzma Gallego, PT Physical Therapist                               PT OP Goals     Row Name 04/25/18 0819          PT Short Term Goals    STG Date to Achieve 05/14/18  -BB     STG 1 LEFS " score to be >/= 20/80  -BB     STG 1 Progress Met  -BB     STG 2 Note a >/= 25% subjective improvement  -BB     STG 2 Progress Met  -BB     STG 3 L knee active extension to -5 degrees or better  -BB     STG 3 Progress Not Met  -BB     STG 4 L knee active flexion to >/= 90 degrees  -BB     STG 4 Progress Not Met  -BB        Long Term Goals    LTG Date to Achieve 06/04/18  -BB     LTG 1 Independent with HEP  -BB     LTG 1 Progress Ongoing  -BB     LTG 2 LEFS score to be >/= 60/80  -BB     LTG 2 Progress Ongoing  -BB     LTG 3 L knee active extension 0 degrees  -BB     LTG 3 Progress Ongoing  -BB     LTG 4 L knee active flexion >/= 120 degrees  -BB     LTG 4 Progress Ongoing  -BB     LTG 5 Demonstrate ability to ascend 5 stairs reciprocally.  -BB     LTG 5 Progress Ongoing  -BB        Time Calculation    PT Goal Re-Cert Due Date 05/14/18  -BB       User Key  (r) = Recorded By, (t) = Taken By, (c) = Cosigned By    Initials Name Provider Type    FELICE Gallego, PT Physical Therapist          Therapy Education  Education Details: HEP for knee flexion highly encouraged   Given: HEP  Program: Reinforced  How Provided: Verbal  Provided to: Patient  Level of Understanding: Verbalized              Time Calculation:   Start Time: 0819  Stop Time: 0924  Time Calculation (min): 65 min    Therapy Charges for Today     Code Description Service Date Service Provider Modifiers Qty    26840335022  PT THER PROC EA 15 MIN 4/25/2018 Uzma Gallego, PT GP 4                    Uzma Gallego PT  4/25/2018

## 2018-04-27 ENCOUNTER — HOSPITAL ENCOUNTER (OUTPATIENT)
Dept: PHYSICAL THERAPY | Facility: HOSPITAL | Age: 63
Setting detail: THERAPIES SERIES
Discharge: HOME | End: 2018-04-27

## 2018-04-27 DIAGNOSIS — G89.29 CHRONIC PAIN OF LEFT KNEE: Primary | ICD-10-CM

## 2018-04-27 DIAGNOSIS — Z96.652 TOTAL KNEE REPLACEMENT STATUS, LEFT: ICD-10-CM

## 2018-04-27 DIAGNOSIS — M25.562 CHRONIC PAIN OF LEFT KNEE: Primary | ICD-10-CM

## 2018-04-27 PROCEDURE — 97010 HOT OR COLD PACKS THERAPY: CPT

## 2018-04-27 PROCEDURE — 97140 MANUAL THERAPY 1/> REGIONS: CPT

## 2018-04-27 PROCEDURE — 97110 THERAPEUTIC EXERCISES: CPT

## 2018-04-27 NOTE — THERAPY TREATMENT NOTE
"    Outpatient Physical Therapy Ortho Treatment Note  [facility]     Patient Name: Heber Houston  : 1955  MRN: [id number]  Today's Date: 2018      Visit Date: 2018  Attendance:   Subjective % Improvement: 40-45%  Recert Date: 5/15/18  MD appointment: May 2018  Visit Dx:    ICD-10-CM ICD-9-CM   1. Chronic pain of left knee M25.562 719.46    G89.29 338.29   2. Total knee replacement status, left Z96.652 V43.65       Patient Active Problem List   Diagnosis   • Allergic angioedema   • Restless leg syndrome   • Neuropathic pain of both legs        Past Medical History:   Diagnosis Date   • Chronic pain disorder    • COPD (chronic obstructive pulmonary disease)    • Hypertension    • Leg pain, bilateral    • Stroke     tia        Past Surgical History:   Procedure Laterality Date   • CAROTID ARTERY ANGIOPLASTY Left    • CHOLECYSTECTOMY     • ENDOSCOPY N/A 2017    Procedure: ESOPHAGOGASTRODUODENOSCOPY;  Surgeon: Sharan Gupta DO;  Location: [facility] ENDOSCOPY;  Service:    • JOINT REPLACEMENT Left 2018    total knee             PT Ortho     Row Name 18 0900       Subjective Comments    Subjective Comments Patient reports he is feeling better. Patient states he has been completing more actvitites around the house such as cutting grass wihtou as much pain  -RF       Subjective Pain    Able to rate subjective pain? yes  -RF    Pre-Treatment Pain Level 2  -RF    Post-Treatment Pain Level 2  -RF       Left Lower Ext    Lt Knee Extension/Flexion AROM 68flex arom, 73 AAROM flex  -RF    Row Name 18 0900       Left Lower Ext    Lt Knee Extension/Flexion AROM --  -BB    Row Name 18 0819       Subjective Comments    Subjective Comments Reports feeling like he was set back last week after missing an appointment due to a MD appointment. \"im really lazy at home thats a big problem\" Reports getting a new bike to help with ROM.   -BB       Precautions and " Contraindications    Precautions TKA 3/26/18  -BB       Posture/Observations    Posture/Observations Comments Ambulates with stiff knee using SC.   -BB       Left Lower Ext    Lt Knee Extension/Flexion AROM ext: -5, flexion: 70 with strap  -BB      User Key  (r) = Recorded By, (t) = Taken By, (c) = Cosigned By    Initials Name Provider Type    BB Uzma Gallego, PT Physical Therapist    RF Austin Hawthorne III, PT Physical Therapist                            PT Assessment/Plan     Row Name 04/27/18 1200          PT Assessment    Assessment Comments Patient tolerated treatment well with implementation of standing knee flexion stretches and mini squats. Patient progressing well and is reporitng signs of functional improvements.   -RF        PT Plan    PT Plan Comments continue with MHP, progress wb strengthening exercises as tolerated along with aarom/rom exercises  -RF       User Key  (r) = Recorded By, (t) = Taken By, (c) = Cosigned By    Initials Name Provider Type    RF Austin Hawthorne III, PT Physical Therapist                Modalities     Row Name 04/27/18 1100             Moist Heat    MH Applied Yes  -RF      Location left knee  -RF      Rx Minutes 10 mins  -RF        User Key  (r) = Recorded By, (t) = Taken By, (c) = Cosigned By    Initials Name Provider Type    RF Austin Hawthorne III, PT Physical Therapist                Exercises     Row Name 04/27/18 1200 04/27/18 0900          Subjective Comments    Subjective Comments  -- Patient reports he is feeling better. Patient states he has been completing more actvitites around the house such as cutting grass wihtou as much pain  -RF        Subjective Pain    Able to rate subjective pain?  -- yes  -RF     Pre-Treatment Pain Level  -- 2  -RF     Post-Treatment Pain Level  -- 2  -RF        Exercise 1    Exercise Name 1 MHP to anterior knee with heel prop   -RF  --     Time 1 10  -RF  --        Exercise 2    Exercise Name 2 aarom heel slides with belt   -RF  --      Sets 2 3  -RF  --     Reps 2 10  -RF  --        Exercise 3    Exercise Name 3 calfboard stretch   -RF  --     Sets 3 3  -RF  --     Time 3 30  -RF  --        Exercise 4    Exercise Name 4 mini squats   -RF  --     Sets 4 3  -RF  --     Reps 4 10  -RF  --        Exercise 5    Exercise Name 5 tke green tb  -RF  --     Sets 5 3  -RF  --     Reps 5 15  -RF  --        Exercise 6    Exercise Name 6 hamstring stretch   -RF  --     Sets 6 3  -RF  --     Reps 6 30  -RF  --       User Key  (r) = Recorded By, (t) = Taken By, (c) = Cosigned By    Initials Name Provider Type    RF Austin Hawthorne III, PT Physical Therapist                        Manual Rx (last 36 hours)      Manual Treatments     Row Name 04/27/18 1100             Manual Rx 1    Manual Rx 1 Location knee extension stretches  -RF      Manual Rx 1 Type left knee   -RF      Manual Rx 1 Duration 10  -RF        User Key  (r) = Recorded By, (t) = Taken By, (c) = Cosigned By    Initials Name Provider Type    RF Austin Hawthorne III, PT Physical Therapist                PT OP Goals     Row Name 04/27/18 1200          PT Short Term Goals    STG Date to Achieve 05/14/18  -RF     STG 1 LEFS score to be >/= 20/80  -RF     STG 1 Progress Met  -RF     STG 2 Note a >/= 25% subjective improvement  -RF     STG 2 Progress Met  -RF     STG 3 L knee active extension to -5 degrees or better  -RF     STG 3 Progress Not Met  -RF     STG 4 L knee active flexion to >/= 90 degrees  -RF     STG 4 Progress Not Met  -RF        Long Term Goals    LTG Date to Achieve 06/04/18  -RF     LTG 1 Independent with HEP  -RF     LTG 1 Progress Ongoing  -RF     LTG 2 LEFS score to be >/= 60/80  -RF     LTG 2 Progress Ongoing  -RF     LTG 3 L knee active extension 0 degrees  -RF     LTG 3 Progress Ongoing  -RF     LTG 4 L knee active flexion >/= 120 degrees  -RF     LTG 4 Progress Ongoing  -RF       User Key  (r) = Recorded By, (t) = Taken By, (c) = Cosigned By    Initials Name Provider Type    RF  Austin Hawthorne III, PT Physical Therapist                         Time Calculation:   Start Time: 0845  Stop Time: 0945  Time Calculation (min): 60 min  PT Non-Billable Time (min): 10 min  Total Timed Code Minutes- PT: 50 minute(s)    Therapy Charges for Today     Code Description Service Date Service Provider Modifiers Qty    95238149597 HC PT MANUAL THERAPY EA 15 MIN 4/27/2018 Austin Hawthorne III, PT GP 1    88233132539 HC PT THER PROC EA 15 MIN 4/27/2018 Austin Hawthorne III, PT GP 2    81583321281 HC PT HOT/COLD PACK WC NONMCARE 4/27/2018 Austin Hawthorne III, PT GP 1                    Austin Hawthorne III, PT  4/27/2018

## 2018-04-30 ENCOUNTER — HOSPITAL ENCOUNTER (OUTPATIENT)
Dept: PHYSICAL THERAPY | Facility: HOSPITAL | Age: 63
Setting detail: THERAPIES SERIES
Discharge: HOME | End: 2018-04-30

## 2018-04-30 DIAGNOSIS — Z96.652 TOTAL KNEE REPLACEMENT STATUS, LEFT: ICD-10-CM

## 2018-04-30 DIAGNOSIS — G89.29 CHRONIC PAIN OF LEFT KNEE: Primary | ICD-10-CM

## 2018-04-30 DIAGNOSIS — M25.562 CHRONIC PAIN OF LEFT KNEE: Primary | ICD-10-CM

## 2018-04-30 PROCEDURE — 97110 THERAPEUTIC EXERCISES: CPT

## 2018-05-02 ENCOUNTER — HOSPITAL ENCOUNTER (OUTPATIENT)
Dept: PHYSICAL THERAPY | Facility: HOSPITAL | Age: 63
Setting detail: THERAPIES SERIES
Discharge: HOME | End: 2018-05-02

## 2018-05-02 DIAGNOSIS — G89.29 CHRONIC PAIN OF LEFT KNEE: ICD-10-CM

## 2018-05-02 DIAGNOSIS — Z96.652 TOTAL KNEE REPLACEMENT STATUS, LEFT: Primary | ICD-10-CM

## 2018-05-02 DIAGNOSIS — M25.562 CHRONIC PAIN OF LEFT KNEE: ICD-10-CM

## 2018-05-02 PROCEDURE — 97110 THERAPEUTIC EXERCISES: CPT

## 2018-05-02 NOTE — THERAPY TREATMENT NOTE
Outpatient Physical Therapy Ortho Treatment Note  [facility]   Myriam Dudley       Patient Name: Heber Houston  : 1955  MRN: [id number]  Today's Date: 2018      Visit Date: 2018   Pt reports 3/10 pain pre treatment, 3/10 pain post treatment  Reports 60% of improvement.  Attended 14/14 visits.  Insurance available: [employer]   Next MD appt: May 2018  Recertification: 05/15/2018.    Visit Dx:    ICD-10-CM ICD-9-CM   1. Total knee replacement status, left Z96.652 V43.65   2. Chronic pain of left knee M25.562 719.46    G89.29 338.29       Patient Active Problem List   Diagnosis   • Allergic angioedema   • Restless leg syndrome   • Neuropathic pain of both legs        Past Medical History:   Diagnosis Date   • Chronic pain disorder    • COPD (chronic obstructive pulmonary disease)    • Hypertension    • Leg pain, bilateral    • Stroke     tia        Past Surgical History:   Procedure Laterality Date   • CAROTID ARTERY ANGIOPLASTY Left    • CHOLECYSTECTOMY     • ENDOSCOPY N/A 2017    Procedure: ESOPHAGOGASTRODUODENOSCOPY;  Surgeon: Sharan Gupta DO;  Location: [facility] ENDOSCOPY;  Service:    • JOINT REPLACEMENT Left 2018    total knee             PT Ortho     Row Name 18 1100       Precautions and Contraindications    Precautions (P)  TKA 3/26/18  -       Posture/Observations    Posture/Observations Comments (P)  Ambulates with STC  -MC       Left Lower Ext    Lt Knee Extension/Flexion AROM (P)  71 Flex AROM; 78 AAROM Seated flexion; Active Ext = -10  -    Row Name 18 1000       Posture/Observations    Posture/Observations Comments (P)  Ambulates with STC  -LORI       Left Lower Ext    Lt Knee Extension/Flexion AROM (P)  65 flexion w/ wall slide. 72 degrees post  -LORI      User Key  (r) = Recorded By, (t) = Taken By, (c) = Cosigned By    Initials Name Provider Type    LORI Gann, ATC     MILAGROS Dudley      "                        PT Assessment/Plan     Row Name 05/02/18 1100          PT Assessment    Assessment Comments (P)  Pt able to complete retro pedal revolution today on PRO II at seat 16. Pt frequently reported discomfort throughout therex. Reported \"feels like a rubber band is tight\" in reference to anterior knee with knee flexion.   -        PT Plan    PT Frequency (P)  3x/week  -     Predicted Duration of Therapy Intervention (OT Eval) (P)  3-4 weeks   -     PT Plan Comments (P)  Continue with POC with ROM emphasis   -       User Key  (r) = Recorded By, (t) = Taken By, (c) = Cosigned By    Initials Name Provider Type     Myriam BERNAL [place]                 Modalities     Row Name 05/02/18 1100             Moist Heat    MH Applied (P)  Yes  -      Location (P)  left knee  -      Rx Minutes (P)  10 mins  -      MH Prior to Rx (P)  Yes  -MC        User Key  (r) = Recorded By, (t) = Taken By, (c) = Cosigned By    Initials Name Provider Type     Myriam BERNAL [email or web address]                Exercises     Row Name 05/02/18 1100             Subjective Comments    Subjective Comments (P)  Pt reports that his pain fluctuates--the more he does the higher his pain. He reports that he is walking much better than he has been. Pt reports that he has not been doing the home exercises that he has been given but he \"modifies them to make them my own. I'm working it but in my own way. I've been using the push mower and things like that. Like I sit on the back of my tailgait and move my leg when I take a break. The ones you all gave me are pretty easy.\" PT reports that he bought a bike that he has been working on his range of motion on his bike. He reports that he feels like he's too tall for a lot of equipment.    -         Subjective Pain    Able to rate subjective pain? (P)  yes  -MC      Pre-Treatment Pain Level (P)  3  -MC         Aquatics    Aquatics performed? (P)  No  -MC         " "Exercise 1    Exercise Name 1 (P)  MHP to anterior knee   -MC      Time 1 (P)  10  -MC         Exercise 2    Exercise Name 2 (P)  Supine wall slides  -MC      Time 2 (P)  5 min  -MC         Exercise 3    Exercise Name 3 (P)  Prone HS curl (L) AROM to AAROM to PROM  -MC      Reps 3 (P)  10  -MC      Time 3 (P)  --  -MC         Exercise 4    Exercise Name 4 (P)  PRO II, seat 16 ROM  -MC      Time 4 (P)  10 mins  -MC         Exercise 5    Exercise Name 5 (P)  Standing lunge stretch L  -MC      Sets 5 (P)  3  -MC      Time 5 (P)  30\"  -MC         Exercise 6    Exercise Name 6 (P)  Standing HS stretch L  -MC      Sets 6 (P)  3  -MC      Time 6 (P)  30\"  -MC         Exercise 7    Exercise Name 7 (P)  TKE   -MC      Sets 7 (P)  2  -MC      Reps 7 (P)  10  -MC      Time 7 (P)  10 sec hold   -MC      Additional Comments (P)  green  -        User Key  (r) = Recorded By, (t) = Taken By, (c) = Cosigned By    Initials Name Provider Type     Myriam BERNAL Luis Enrique                                PT OP Goals     Row Name 05/02/18 1100          PT Short Term Goals    STG Date to Achieve (P)  05/14/18  -     STG 1 (P)  LEFS score to be >/= 20/80  -     STG 1 Progress (P)  Met  -     STG 2 (P)  Note a >/= 25% subjective improvement  -     STG 2 Progress (P)  Met  -     STG 3 (P)  L knee active extension to -5 degrees or better  -     STG 3 Progress (P)  Not Met  -     STG 4 (P)  L knee active flexion to >/= 90 degrees  -     STG 4 Progress (P)  Not Met  -        Long Term Goals    LTG Date to Achieve (P)  06/04/18  -     LTG 1 (P)  Independent with HEP  -     LTG 1 Progress (P)  Ongoing  -     LTG 2 (P)  LEFS score to be >/= 60/80  -     LTG 2 Progress (P)  Ongoing  -     LTG 3 (P)  L knee active extension 0 degrees  -     LTG 3 Progress (P)  Ongoing  -     LTG 4 (P)  L knee active flexion >/= 120 degrees  -MC     LTG 4 Progress (P)  Ongoing  -       User Key  (r) = Recorded By, (t) = " Taken By, (c) = Cosigned By    Initials Name Provider Type    MC Myriam Dudley           Therapy Education  Given: (P) HEP  Program: (P) Reinforced  How Provided: (P) Verbal  Provided to: (P) Patient  Level of Understanding: (P) Verbalized              Time Calculation:   Start Time: (P) 1055  Stop Time: (P) 1153  Time Calculation (min): (P) 58 min    Therapy Charges for Today     Code Description Service Date Service Provider Modifiers Qty    92377266988 HC PT THER PROC EA 15 MIN 5/2/2018 Myriam Dudley GP 3    71742853399 HC PT THER SUPP EA 15 MIN 5/2/2018 Myriam Dudley GP 1                    Myriam Dudley  5/2/2018

## 2018-05-04 ENCOUNTER — HOSPITAL ENCOUNTER (OUTPATIENT)
Dept: PHYSICAL THERAPY | Facility: HOSPITAL | Age: 63
Setting detail: THERAPIES SERIES
Discharge: HOME | End: 2018-05-04

## 2018-05-04 DIAGNOSIS — M25.562 CHRONIC PAIN OF LEFT KNEE: ICD-10-CM

## 2018-05-04 DIAGNOSIS — Z96.652 TOTAL KNEE REPLACEMENT STATUS, LEFT: Primary | ICD-10-CM

## 2018-05-04 DIAGNOSIS — G89.29 CHRONIC PAIN OF LEFT KNEE: ICD-10-CM

## 2018-05-04 PROCEDURE — 97110 THERAPEUTIC EXERCISES: CPT

## 2018-05-04 NOTE — THERAPY TREATMENT NOTE
Outpatient Physical Therapy Ortho Treatment Note  [facility]   Myriam Dudley       Patient Name: Heber Houston  : 1955  MRN: [id number]  Today's Date: 2018      Visit Date: 2018   Pt reports 3/10 pain pre treatment, 4/10 pain post treatment  Reports 60% of improvement.  Attended 15/15 visits.  Insurance available: Med Nec   Next MD appt: May 2018.  Recertification: 05/15/2018.    Visit Dx:    ICD-10-CM ICD-9-CM   1. Total knee replacement status, left Z96.652 V43.65   2. Chronic pain of left knee M25.562 719.46    G89.29 338.29       Patient Active Problem List   Diagnosis   • Allergic angioedema   • Restless leg syndrome   • Neuropathic pain of both legs        Past Medical History:   Diagnosis Date   • Chronic pain disorder    • COPD (chronic obstructive pulmonary disease)    • Hypertension    • Leg pain, bilateral    • Stroke     tia        Past Surgical History:   Procedure Laterality Date   • CAROTID ARTERY ANGIOPLASTY Left    • CHOLECYSTECTOMY     • ENDOSCOPY N/A 2017    Procedure: ESOPHAGOGASTRODUODENOSCOPY;  Surgeon: Sharan Gupta DO;  Location: [facility] ENDOSCOPY;  Service:    • JOINT REPLACEMENT Left 2018    total knee             PT Ortho     Row Name 18 1100       Precautions and Contraindications    Precautions (P)  TKA 3/26/18  -       Posture/Observations    Posture/Observations Comments (P)  Ambulates with STC  -MC       Left Lower Ext    Lt Knee Extension/Flexion AROM (P)  68 AAROM flexion (PRO II); 60 PROM flexion  -    Row Name 18 1100       Precautions and Contraindications    Precautions (P)  TKA 3/26/18  -       Posture/Observations    Posture/Observations Comments (P)  Ambulates with STC  -MC       Left Lower Ext    Lt Knee Extension/Flexion AROM (P)  71 Flex AROM; 78 AAROM Seated flexion; Active Ext = -10  -MC      User Key  (r) = Recorded By, (t) = Taken By, (c) = Cosigned By    Initials Name Provider Type      "Myriam BERNAL [employer]                             PT Assessment/Plan     Row Name 05/04/18 1100          PT Assessment    Assessment Comments (P)  Pt displays lack of self efficacy throughout therex. Pt frequently states that he will not be able to accomplish exercises within the parameters set. For example, pt reports that he will not see any improvement over last visit when bicycle seat at set 15 instead of seat 16. Pt required frequent reminders that he needs to improve ROM, not complete exercises well within comfortable ROM. Pt reported frequent discomfort with therex, particularly at end ROM.   -MC        PT Plan    PT Frequency (P)  3x/week  -MC     Predicted Duration of Therapy Intervention (OT Eval) (P)  3-4 weeks  -MC     PT Plan Comments (P)  Continue to emphasize ROM. RTMD after next visit.  -MC       User Key  (r) = Recorded By, (t) = Taken By, (c) = Cosigned By    Initials Name Provider Type     Myriam [place]                 Modalities     Row Name 05/04/18 1100             Moist Heat    MH Applied (P)  Yes  -MC      Location (P)  left knee  -MC      Rx Minutes (P)  10 mins  -      MH Prior to Rx (P)  Yes  -MC        User Key  (r) = Recorded By, (t) = Taken By, (c) = Cosigned By    Initials Name Provider Type     Myriam BERNAL [email or web address]                Exercises     Row Name 05/04/18 1100             Subjective Comments    Subjective Comments (P)  Pt reports at initiation of treatment that he does not believe that he will do as well as he did last visit. Pt reports that his knee feels \"like a balloon of swelling\". Pt reports that he again did not complete his HEP but instead did his own exercises.   -MC         Subjective Pain    Able to rate subjective pain? (P)  yes  -MC      Pre-Treatment Pain Level (P)  3  -MC         Aquatics    Aquatics performed? (P)  No  -MC         Exercise 1    Exercise Name 1 (P)  MHP to anterior knee  -MC      Time 1 (P)  10 mins  " "-MC         Exercise 2    Exercise Name 2 (P)  Supine wall slides  -MC      Time 2 (P)  5 min  -MC         Exercise 3    Exercise Name 3 (P)  Prone HS curl (AROM to AAROM to PROM)  -MC      Sets 3 (P)  2  -MC      Reps 3 (P)  5  -MC         Exercise 4    Exercise Name 4 (P)  PRO II, seat 15 ROM  -MC      Time 4 (P)  10 mins  -      Additional Comments (P)  10 sec hold at end ROM fwd/back pedal   -MC         Exercise 5    Exercise Name 5 (P)  Standing HS stretch  -MC      Sets 5 (P)  3  -MC      Time 5 (P)  30\"  -MC         Exercise 6    Exercise Name 6 (P)  Hip flexor lunge stretcg  -MC      Sets 6 (P)  3  -MC      Time 6 (P)  30\"  -MC         Exercise 7    Exercise Name 7 (P)  TKE  -MC      Sets 7 (P)  2  -MC      Reps 7 (P)  10  -MC      Time 7 (P)  10 sec hold   -      Additional Comments (P)  green  -        User Key  (r) = Recorded By, (t) = Taken By, (c) = Cosigned By    Initials Name Provider Type     Myriam BERNAL [facility]                                PT OP Goals     Row Name 05/04/18 1100          PT Short Term Goals    STG Date to Achieve (P)  05/14/18  -     STG 1 (P)  LEFS score to be >/= 20/80  -     STG 1 Progress (P)  Met  -     STG 2 (P)  Note a >/= 25% subjective improvement  -     STG 2 Progress (P)  Met  -     STG 3 (P)  L knee active extension to -5 degrees or better  -     STG 3 Progress (P)  Not Met  -     STG 4 (P)  L knee active flexion to >/= 90 degrees  -     STG 4 Progress (P)  Not Met  -        Long Term Goals    LTG Date to Achieve (P)  06/04/18  -     LTG 1 (P)  Independent with HEP  -     LTG 1 Progress (P)  Ongoing  -     LTG 2 (P)  LEFS score to be >/= 60/80  -     LTG 2 Progress (P)  Ongoing  -     LTG 3 (P)  L knee active extension 0 degrees  -     LTG 3 Progress (P)  Ongoing  -     LTG 4 (P)  L knee active flexion >/= 120 degrees  -MC     LTG 4 Progress (P)  Ongoing  -       User Key  (r) = Recorded By, (t) = Taken By, (c) = " Cosigned By    Initials Name Provider Type     Myriam Dudley           Therapy Education  Given: (P) HEP  Program: (P) Reinforced  How Provided: (P) Verbal  Provided to: (P) Patient  Level of Understanding: (P) Verbalized              Time Calculation:   Start Time: (P) 1058  Stop Time: (P) 1159  Time Calculation (min): (P) 61 min    Therapy Charges for Today     Code Description Service Date Service Provider Modifiers Qty    54853085408 HC PT THER PROC EA 15 MIN 5/4/2018 Myriam uDdley GP 3    82599279043 HC PT THER SUPP EA 15 MIN 5/4/2018 Myriam Dudley GP 1                    Myriam Dudley  5/4/2018

## 2018-05-07 ENCOUNTER — HOSPITAL ENCOUNTER (OUTPATIENT)
Dept: PHYSICAL THERAPY | Facility: HOSPITAL | Age: 63
Setting detail: THERAPIES SERIES
Discharge: HOME | End: 2018-05-07

## 2018-05-07 DIAGNOSIS — M25.562 CHRONIC PAIN OF LEFT KNEE: ICD-10-CM

## 2018-05-07 DIAGNOSIS — Z96.652 TOTAL KNEE REPLACEMENT STATUS, LEFT: Primary | ICD-10-CM

## 2018-05-07 DIAGNOSIS — G89.29 CHRONIC PAIN OF LEFT KNEE: ICD-10-CM

## 2018-05-07 PROCEDURE — 97110 THERAPEUTIC EXERCISES: CPT

## 2018-05-07 NOTE — THERAPY TREATMENT NOTE
Outpatient Physical Therapy Ortho Treatment Note  [facility]     Patient Name: Heber Houston  : 1955  MRN: [id number]  Today's Date: 2018      Visit Date: 2018     Subjective Improvement 55-60%  Visits 16/16  Visits approved no visit limit  RTMD 2018  MD note faxed  recert date5-/    S/P left TKA on 3-    Visit Dx:    ICD-10-CM ICD-9-CM   1. Total knee replacement status, left Z96.652 V43.65   2. Chronic pain of left knee M25.562 719.46    G89.29 338.29       Patient Active Problem List   Diagnosis   • Allergic angioedema   • Restless leg syndrome   • Neuropathic pain of both legs        Past Medical History:   Diagnosis Date   • Chronic pain disorder    • COPD (chronic obstructive pulmonary disease)    • Hypertension    • Leg pain, bilateral    • Stroke     tia        Past Surgical History:   Procedure Laterality Date   • CAROTID ARTERY ANGIOPLASTY Left    • CHOLECYSTECTOMY     • ENDOSCOPY N/A 2017    Procedure: ESOPHAGOGASTRODUODENOSCOPY;  Surgeon: Sharan Gupta DO;  Location: [facility] ENDOSCOPY;  Service:    • JOINT REPLACEMENT Left 2018    total knee             PT Ortho     Row Name 18 1300       Lower Extremity (Manual Muscle Testing)    Lower Extremity: Manual Muscle Testing (MMT) left hip strength deficit;left knee strength deficit  -CP       Left Hip (Manual Muscle Testing)    Left Hip Manual Muscle Testing (MMT) flexion  -CP    MMT: Flexion, Left Hip flexion  -CP    MMT, Gross Movement: Left Hip Flexion (4-/5) good minus  -CP       Left Knee (Manual Muscle Testing)    Left Knee Manual Muscle Testing (MMT) extension;flexion  -CP    MMT: Extension, Left Knee extension  -CP    MMT, Gross Movement: Left Knee Extension (4/5) good  -CP    MMT: Flexion, Left Knee flexion  -CP    MMT, Gross Movement: Left Knee Flexion (4/5) good  -CP    Row Name 18 1100       Precautions and Contraindications    Precautions TKA josy 3-  -CP        Subjective Pain    Able to rate subjective pain? yes  -CP    Pre-Treatment Pain Level 4  -CP    Post-Treatment Pain Level 4  -CP       Posture/Observations    Posture/Observations Comments amb with sc  -CP       Left Lower Ext    Lt Knee Extension/Flexion AROM AROM 0-03-85  -CP      User Key  (r) = Recorded By, (t) = Taken By, (c) = Cosigned By    Initials Name Provider Type    CP Natasha Foy PTA Physical Therapy Assistant                            PT Assessment/Plan     Row Name 05/07/18 1404          PT Assessment    Assessment Comments Patient has been slow to progress secondary to reports of increase pain and non compliant with ther ex in clinic and with HEP.  -CP        PT Plan    PT Frequency 3x/week  -CP     Predicted Duration of Therapy Intervention (OT Eval) 3-4 weeks  -CP     PT Plan Comments Cont with POC.  Recheck schedule for next week.  MD note faxed.  -CP       User Key  (r) = Recorded By, (t) = Taken By, (c) = Cosigned By    Initials Name Provider Type    CP Natasha Foy PTA Physical Therapy Assistant                    Exercises     Row Name 05/07/18 1100             Subjective Comments    Subjective Comments Patient states that after last week he feels he has been declining in ROM.  States that he will do what he wants,  He pays us not the other way around.  Some goes for the MD.  -CP         Subjective Pain    Able to rate subjective pain? yes  -CP      Pre-Treatment Pain Level 4  -CP      Post-Treatment Pain Level 4  -CP         Aquatics    Aquatics performed? No  -CP         Exercise 1    Exercise Name 1 Pro II level 2  -CP      Time 1 10  -CP      Additional Comments seat 15 rocking to start  -CP         Exercise 2    Exercise Name 2 incline stretch  -CP      Sets 2 3  -CP      Time 2 30  -CP         Exercise 3    Exercise Name 3 Standing hs stretch  -CP      Sets 3 3  -CP      Time 3 30  -CP         Exercise 4    Exercise Name 4 Standing lunge stretch  -CP      Sets 4 3  -CP   "    Time 4 30  -CP         Exercise 5    Exercise Name 5 CR/TR  -CP      Sets 5 2  -CP      Reps 5 10  -CP         Exercise 6    Exercise Name 6 step up 4\"  -CP      Sets 6 2  -CP      Reps 6 10  -CP         Exercise 7    Exercise Name 7 knee fl ext swings on high table.  -CP      Time 7 3  -CP         Exercise 8    Exercise Name 8 MMT  -CP         Exercise 9    Exercise Name 9 Seated Knee LLPS  -CP      Time 9 10  -CP         Exercise 10    Exercise Name 10 QS  -CP      Sets 10 2  -CP      Reps 10 10  -CP         Exercise 11    Exercise Name 11 heel prop  -CP      Time 11 2  -CP        User Key  (r) = Recorded By, (t) = Taken By, (c) = Cosigned By    Initials Name Provider Type    CP Natasha Foy PTA Physical Therapy Assistant                               PT OP Goals     Row Name 05/07/18 1400          PT Short Term Goals    STG Date to Achieve 05/14/18  -CP     STG 1 LEFS score to be >/= 20/80  -CP     STG 1 Progress Met  -CP     STG 2 Note a >/= 25% subjective improvement  -CP     STG 2 Progress Met  -CP     STG 3 L knee active extension to -5 degrees or better  -CP     STG 3 Progress Not Met  -CP     STG 4 L knee active flexion to >/= 90 degrees  -CP     STG 4 Progress Not Met  -CP        Long Term Goals    LTG Date to Achieve 06/04/18  -CP     LTG 1 Independent with HEP  -CP     LTG 1 Progress Ongoing  -CP     LTG 2 LEFS score to be >/= 60/80  -CP     LTG 2 Progress Ongoing  -CP     LTG 3 L knee active extension 0 degrees  -CP     LTG 3 Progress Ongoing  -CP     LTG 4 L knee active flexion >/= 120 degrees  -CP     LTG 4 Progress Ongoing  -CP        Time Calculation    PT Goal Re-Cert Due Date 05/15/18  -CP       User Key  (r) = Recorded By, (t) = Taken By, (c) = Cosigned By    Initials Name Provider Type    CP Natasha Foy PTA Physical Therapy Assistant                         Time Calculation:   Start Time: 1100  Stop Time: 1155  Time Calculation (min): 55 min  Total Timed Code Minutes- PT: 55 " minute(s)    Therapy Charges for Today     Code Description Service Date Service Provider Modifiers Qty    80290205485 HC PT THER PROC EA 15 MIN 5/7/2018 Natasha Foy, PTA GP 4                    Natasha Foy PTA  5/7/2018

## 2018-05-09 ENCOUNTER — HOSPITAL ENCOUNTER (OUTPATIENT)
Dept: PHYSICAL THERAPY | Facility: HOSPITAL | Age: 63
Setting detail: THERAPIES SERIES
Discharge: HOME | End: 2018-05-09

## 2018-05-09 DIAGNOSIS — G89.29 CHRONIC PAIN OF LEFT KNEE: ICD-10-CM

## 2018-05-09 DIAGNOSIS — Z96.652 TOTAL KNEE REPLACEMENT STATUS, LEFT: Primary | ICD-10-CM

## 2018-05-09 DIAGNOSIS — M25.562 CHRONIC PAIN OF LEFT KNEE: ICD-10-CM

## 2018-05-09 PROCEDURE — 97110 THERAPEUTIC EXERCISES: CPT

## 2018-05-09 NOTE — THERAPY TREATMENT NOTE
Outpatient Physical Therapy Ortho Treatment Note  [facility]     Patient Name: Heber Houston  : 1955  MRN: [id number]  Today's Date: 2018      Visit Date: 2018     Subjective Improvement 55-60%  Visits   Visits approved No limit  RTMD 7 weeks  Recert 5-    S/P Left TKA on 3-    Visit Dx:    ICD-10-CM ICD-9-CM   1. Total knee replacement status, left Z96.652 V43.65   2. Chronic pain of left knee M25.562 719.46    G89.29 338.29       Patient Active Problem List   Diagnosis   • Allergic angioedema   • Restless leg syndrome   • Neuropathic pain of both legs        Past Medical History:   Diagnosis Date   • Chronic pain disorder    • COPD (chronic obstructive pulmonary disease)    • Hypertension    • Leg pain, bilateral    • Stroke     tia        Past Surgical History:   Procedure Laterality Date   • CAROTID ARTERY ANGIOPLASTY Left    • CHOLECYSTECTOMY     • ENDOSCOPY N/A 2017    Procedure: ESOPHAGOGASTRODUODENOSCOPY;  Surgeon: Sharan Gupta DO;  Location: [facility] ENDOSCOPY;  Service:    • JOINT REPLACEMENT Left 2018    total knee             PT Ortho     Row Name 18 1100       Posture/Observations    Posture/Observations Comments amb with SC and stiff nee gait.  -CP       Left Lower Ext    Lt Knee Extension/Flexion AROM AROM supine 0-03-86  -CP    Row Name 18 1300       Lower Extremity (Manual Muscle Testing)    Lower Extremity: Manual Muscle Testing (MMT) left hip strength deficit;left knee strength deficit  -CP       Left Hip (Manual Muscle Testing)    Left Hip Manual Muscle Testing (MMT) flexion  -CP    MMT: Flexion, Left Hip flexion  -CP    MMT, Gross Movement: Left Hip Flexion (4-/5) good minus  -CP       Left Knee (Manual Muscle Testing)    Left Knee Manual Muscle Testing (MMT) extension;flexion  -CP    MMT: Extension, Left Knee extension  -CP    MMT, Gross Movement: Left Knee Extension (4/5) good  -CP    MMT: Flexion, Left Knee  flexion  -CP    MMT, Gross Movement: Left Knee Flexion (4/5) good  -CP    Row Name 05/07/18 1100       Precautions and Contraindications    Precautions TKA josy 3-  -CP       Subjective Pain    Able to rate subjective pain? yes  -CP    Pre-Treatment Pain Level 4  -CP    Post-Treatment Pain Level 4  -CP       Posture/Observations    Posture/Observations Comments amb with sc  -CP       Left Lower Ext    Lt Knee Extension/Flexion AROM AROM 0-03-85  -CP      User Key  (r) = Recorded By, (t) = Taken By, (c) = Cosigned By    Initials Name Provider Type    CP Natasha Foy PTA Physical Therapy Assistant                            PT Assessment/Plan     Row Name 05/09/18 1344          PT Assessment    Assessment Comments Patient displayed increase tolerance and compliance to ther ex today vs past PT visits.   -CP        PT Plan    PT Frequency 3x/week  -CP     Predicted Duration of Therapy Intervention (OT Eval) 3-4 weeks  -CP     PT Plan Comments Cont with POC.  MD order for ext brace has been faxed to [facility]  -CP       User Key  (r) = Recorded By, (t) = Taken By, (c) = Cosigned By    Initials Name Provider Type    CP Natasha Foy PTA Physical Therapy Assistant                    Exercises     Row Name 05/09/18 1100             Subjective Comments    Subjective Comments Patient states that MD was pleased with his progress so far.  However, if ROM did not increase within 7 weeks, he wants to do a manipulation.  Patient is not in favor of this and wants to work hard at home and in therapy.  He has new orders from MD for aggressive ROM and a knee ext brace.  -CP         Subjective Pain    Able to rate subjective pain? yes  -CP      Pre-Treatment Pain Level 3  -CP      Post-Treatment Pain Level 2  -CP         Aquatics    Aquatics performed? No  -CP         Exercise 1    Exercise Name 1 Pro II level 2  -CP      Time 1 10  -CP      Additional Comments Seat 15 rocking  -CP         Exercise 2    Exercise Name 2 incline  stretch  -CP      Sets 2 3  -CP      Time 2 30  -CP         Exercise 3    Exercise Name 3 Standing HS Stretch  -CP      Cueing 3 Verbal  -CP      Sets 3 3  -CP      Time 3 30  -CP         Exercise 4    Exercise Name 4 Standing Lunge stretch  -CP      Sets 4 3  -CP      Time 4 30  -CP         Exercise 5    Exercise Name 5 mini Squat  -CP      Cueing 5 Verbal  -CP      Sets 5 2  -CP      Reps 5 10  -CP         Exercise 6    Exercise Name 6 Cybex leg press  -CP      Sets 6 3  -CP      Reps 6 10  -CP      Time 6 90 lb  -CP         Exercise 7    Exercise Name 7 Seated Knee fl LLPS  -CP      Time 7 5  -CP         Exercise 8    Exercise Name 8 prone knee hang  -CP      Time 8 5  -CP        User Key  (r) = Recorded By, (t) = Taken By, (c) = Cosigned By    Initials Name Provider Type    CP Natasha Foy PTA Physical Therapy Assistant                               PT OP Goals     Row Name 05/09/18 1300          PT Short Term Goals    STG Date to Achieve 05/14/18  -CP     STG 1 LEFS score to be >/= 20/80  -CP     STG 1 Progress Met  -CP     STG 2 Note a >/= 25% subjective improvement  -CP     STG 2 Progress Met  -CP     STG 3 L knee active extension to -5 degrees or better  -CP     STG 3 Progress Not Met  -CP     STG 4 L knee active flexion to >/= 90 degrees  -CP     STG 4 Progress Not Met  -CP        Long Term Goals    LTG Date to Achieve 06/04/18  -CP     LTG 1 Independent with HEP  -CP     LTG 1 Progress Ongoing  -CP     LTG 2 LEFS score to be >/= 60/80  -CP     LTG 2 Progress Ongoing  -CP     LTG 3 L knee active extension 0 degrees  -CP     LTG 3 Progress Ongoing  -CP     LTG 4 L knee active flexion >/= 120 degrees  -CP     LTG 4 Progress Ongoing  -CP        Time Calculation    PT Goal Re-Cert Due Date 05/15/18  -CP       User Key  (r) = Recorded By, (t) = Taken By, (c) = Cosigned By    Initials Name Provider Type    CP Natasha Foy PTA Physical Therapy Assistant          Therapy Education  Education Details:  Prone knee hangs  Given: HEP  Program: New  How Provided: Verbal, Demonstration  Provided to: Patient  Level of Understanding: Verbalized, Demonstrated              Time Calculation:   Start Time: 1100  Stop Time: 1200  Time Calculation (min): 60 min  Total Timed Code Minutes- PT: 60 minute(s)    Therapy Charges for Today     Code Description Service Date Service Provider Modifiers Qty    69371949369 HC PT THER PROC EA 15 MIN 5/9/2018 Natasha Foy, PTA GP 4                    Natasha Foy PTA  5/9/2018

## 2018-05-11 ENCOUNTER — HOSPITAL ENCOUNTER (OUTPATIENT)
Dept: PHYSICAL THERAPY | Facility: HOSPITAL | Age: 63
Setting detail: THERAPIES SERIES
Discharge: HOME | End: 2018-05-11

## 2018-05-11 DIAGNOSIS — G89.29 CHRONIC PAIN OF LEFT KNEE: ICD-10-CM

## 2018-05-11 DIAGNOSIS — Z96.652 TOTAL KNEE REPLACEMENT STATUS, LEFT: Primary | ICD-10-CM

## 2018-05-11 DIAGNOSIS — M25.562 CHRONIC PAIN OF LEFT KNEE: ICD-10-CM

## 2018-05-11 PROCEDURE — 97110 THERAPEUTIC EXERCISES: CPT

## 2018-05-11 NOTE — THERAPY TREATMENT NOTE
Outpatient Physical Therapy Ortho Treatment Note  [facility]     Patient Name: Heber Houston  : 1955  MRN: [id number]  Today's Date: 2018      Visit Date: 2018    Subjective Improvement 55-60%  Visits   Visits approved no limit  RTMD 7 weeks  Recert 5-    S/P left TKA on 3-    Visit Dx:    ICD-10-CM ICD-9-CM   1. Total knee replacement status, left Z96.652 V43.65   2. Chronic pain of left knee M25.562 719.46    G89.29 338.29       Patient Active Problem List   Diagnosis   • Allergic angioedema   • Restless leg syndrome   • Neuropathic pain of both legs        Past Medical History:   Diagnosis Date   • Chronic pain disorder    • COPD (chronic obstructive pulmonary disease)    • Hypertension    • Leg pain, bilateral    • Stroke     tia        Past Surgical History:   Procedure Laterality Date   • CAROTID ARTERY ANGIOPLASTY Left    • CHOLECYSTECTOMY     • ENDOSCOPY N/A 2017    Procedure: ESOPHAGOGASTRODUODENOSCOPY;  Surgeon: Sharan Gupta DO;  Location: [facility] ENDOSCOPY;  Service:    • JOINT REPLACEMENT Left 2018    total knee             PT Ortho     Row Name 18 1000       Subjective Comments    Subjective Comments States that he has started working harder at home with his HEP.  He knows that it will be painful and just needs to push it.  -CP       Precautions and Contraindications    Precautions TKA on 3-  -CP       Subjective Pain    Able to rate subjective pain? yes  -CP    Pre-Treatment Pain Level 4  -CP       Posture/Observations    Posture/Observations Comments amb with SC and stiff knee  -CP       Left Lower Ext    Lt Knee Extension/Flexion AROM AROM supine 0-88  -CP    Row Name 18 1100       Posture/Observations    Posture/Observations Comments amb with SC and stiff nee gait.  -CP       Left Lower Ext    Lt Knee Extension/Flexion AROM AROM supine 0-03-86  -CP      User Key  (r) = Recorded By, (t) = Taken By, (c) =  Cosigned By    Initials Name Provider Type    CP Natasha Foy PTA Physical Therapy Assistant                            PT Assessment/Plan     Row Name 05/11/18 1110          PT Assessment    Assessment Comments Patient appears move movitatived this date vs last PT visit.  Patient was able to reach 0 ext after prone knee hang with weight.    -CP        PT Plan    PT Frequency 3x/week  -CP     Predicted Duration of Therapy Intervention (OT Eval) 3-4 weeks  -CP     PT Plan Comments will marie TREY and discuss the need for an ext brace.  Cont with aggressive ROM  -CP       User Key  (r) = Recorded By, (t) = Taken By, (c) = Cosigned By    Initials Name Provider Type    CP Natasha Foy PTA Physical Therapy Assistant                    Exercises     Row Name 05/11/18 1000             Subjective Comments    Subjective Comments States that he has started working harder at home with his HEP.  He knows that it will be painful and just needs to push it.  -CP         Subjective Pain    Able to rate subjective pain? yes  -CP      Pre-Treatment Pain Level 4  -CP      Post-Treatment Pain Level 3  -CP         Aquatics    Aquatics performed? No  -CP         Exercise 1    Exercise Name 1 Pro II level 2  -CP      Time 1 15  -CP      Additional Comments Seat 16  -CP         Exercise 2    Exercise Name 2 Cybex leg press  -CP      Sets 2 3  -CP      Reps 2 10  -CP      Time 2 95 lb  -CP         Exercise 3    Exercise Name 3 mini squats  -CP      Sets 3 2  -CP      Reps 3 10  -CP         Exercise 4    Exercise Name 4 LLPS seate knee fl  -CP      Time 4 5  -CP         Exercise 5    Exercise Name 5 Manual knee fl stretch  -CP      Time 5 3  -CP         Exercise 6    Exercise Name 6 Prone knee hang  -CP      Time 6 5  -CP      Additional Comments 2 lb  -CP        User Key  (r) = Recorded By, (t) = Taken By, (c) = Cosigned By    Initials Name Provider Type    CP Natasha Foy PTA Physical Therapy Assistant                                PT OP Goals     Row Name 05/11/18 1100          PT Short Term Goals    STG Date to Achieve 05/14/18  -CP     STG 1 LEFS score to be >/= 20/80  -CP     STG 1 Progress Met  -CP     STG 2 Note a >/= 25% subjective improvement  -CP     STG 2 Progress Met  -CP     STG 3 L knee active extension to -5 degrees or better  -CP     STG 3 Progress Not Met  -CP     STG 4 L knee active flexion to >/= 90 degrees  -CP     STG 4 Progress Progressing  -CP        Long Term Goals    LTG Date to Achieve 06/04/18  -CP     LTG 1 Independent with HEP  -CP     LTG 1 Progress Ongoing  -CP     LTG 2 LEFS score to be >/= 60/80  -CP     LTG 2 Progress Ongoing  -CP     LTG 3 L knee active extension 0 degrees  -CP     LTG 3 Progress Ongoing  -CP     LTG 4 L knee active flexion >/= 120 degrees  -CP     LTG 4 Progress Ongoing  -CP        Time Calculation    PT Goal Re-Cert Due Date 05/15/18  -CP       User Key  (r) = Recorded By, (t) = Taken By, (c) = Cosigned By    Initials Name Provider Type    CP Natasha Foy PTA Physical Therapy Assistant          Therapy Education  Education Details: encouraged patient to perform HEP              Time Calculation:   Start Time: 1020  Stop Time: 1110  Time Calculation (min): 50 min  Total Timed Code Minutes- PT: 50 minute(s)    Therapy Charges for Today     Code Description Service Date Service Provider Modifiers Qty    21188303654 HC PT THER PROC EA 15 MIN 5/11/2018 Natasha Foy PTA GP 3                    Natasha Foy PTA  5/11/2018

## 2018-05-15 ENCOUNTER — HOSPITAL ENCOUNTER (OUTPATIENT)
Dept: PHYSICAL THERAPY | Facility: HOSPITAL | Age: 63
Setting detail: THERAPIES SERIES
Discharge: HOME | End: 2018-05-15

## 2018-05-15 DIAGNOSIS — G89.29 CHRONIC PAIN OF LEFT KNEE: ICD-10-CM

## 2018-05-15 DIAGNOSIS — Z96.652 TOTAL KNEE REPLACEMENT STATUS, LEFT: Primary | ICD-10-CM

## 2018-05-15 DIAGNOSIS — M25.562 CHRONIC PAIN OF LEFT KNEE: ICD-10-CM

## 2018-05-15 PROCEDURE — 97110 THERAPEUTIC EXERCISES: CPT | Performed by: PHYSICAL THERAPIST

## 2018-05-15 PROCEDURE — 97140 MANUAL THERAPY 1/> REGIONS: CPT | Performed by: PHYSICAL THERAPIST

## 2018-05-16 NOTE — THERAPY PROGRESS REPORT/RE-CERT
"    Outpatient Physical Therapy Ortho Progress Note  [facility]     Patient Name: Heber Houston  : 1955  MRN: [id number]  Today's Date: 5/15/2018      Visit Date: 05/15/2018  Attendance:  (no visit limit)  Subjective Improvement: 55-60%  Next MD Appt: 6-7 weeks  Recert Date: 18    Therapy Diagnosis: L TKA 3/26/18    Changes in Medications: none noted  Changes in MD Orders: aggressive ROM per MD orders on 18; MD has requested extension splint  Number of Work Days Lost: since surgery       Past Medical History:   Diagnosis Date   • Chronic pain disorder    • COPD (chronic obstructive pulmonary disease)    • Hypertension    • Leg pain, bilateral    • Stroke     tia        Past Surgical History:   Procedure Laterality Date   • CAROTID ARTERY ANGIOPLASTY Left    • CHOLECYSTECTOMY     • ENDOSCOPY N/A 2017    Procedure: ESOPHAGOGASTRODUODENOSCOPY;  Surgeon: Sharan Gupta DO;  Location: [facility] ENDOSCOPY;  Service:    • JOINT REPLACEMENT Left 2018    total knee       Visit Dx:     ICD-10-CM ICD-9-CM   1. Total knee replacement status, left Z96.652 V43.65   2. Chronic pain of left knee M25.562 719.46    G89.29 338.29                 PT Ortho     Row Name 05/15/18 1100       Subjective Comments    Subjective Comments \"Pretty good\" with HEP. Working on exercises \" 4 hours throughout the day, 30 minutes here, 30 minutes there.\" Mowing, lifting concrete bags, small mechanical work. Doing stuff to that moves him around, but not really concentrated motion. States that he's tired of the pain and considering going ahead and having the knee manipulated.  -SS       Precautions and Contraindications    Precautions TKA on 3-  -SS       Subjective Pain    Able to rate subjective pain? yes  -SS    Pre-Treatment Pain Level 3  -SS    Post-Treatment Pain Level 2  -SS    Subjective Pain Comment \"Stiffness\"  -SS       Posture/Observations    Posture/Observations Comments Presents " ambulating with STC. Ambulates in clinic without assistive device. Stiff L knee gait.  -SS       Left Lower Ext    Lt Knee Extension/Flexion AROM 0-5-85; achieves 90 deg AA flexion with strap   0-90 after manual  -SS      User Key  (r) = Recorded By, (t) = Taken By, (c) = Cosigned By    Initials Name Provider Type    SS Pablo Dickey, PT DPT Physical Therapist                      Therapy Education  Education Details: HEP compliance, treatment after manipulation  Given: HEP, Symptoms/condition management  Program: Reinforced  How Provided: Verbal  Provided to: Patient  Level of Understanding: Verbalized           PT OP Goals     Row Name 05/15/18 1100          PT Short Term Goals    STG Date to Achieve --   additional STGs deferred  -SS     STG 1 LEFS score to be >/= 20/80  -SS     STG 1 Progress Met  -SS     STG 2 Note a >/= 25% subjective improvement  -SS     STG 2 Progress Met  -SS     STG 3 L knee active extension to -5 degrees or better  -     STG 3 Progress Met  -SS     STG 4 L knee active flexion to >/= 90 degrees  -SS     STG 4 Progress Not Met  -SS     STG 4 Progress Comments met at end of treatment this date  -        Long Term Goals    LTG Date to Achieve 06/04/18   further TBA  -SS     LTG 1 Independent with HEP  -SS     LTG 1 Progress Ongoing  -SS     LTG 2 LEFS score to be >/= 60/80  -     LTG 2 Progress Ongoing  -SS     LTG 3 L knee active extension 0 degrees  -     LTG 3 Progress Ongoing  -SS     LTG 4 L knee active flexion >/= 110 degrees  -     LTG 4 Progress Ongoing;Goal Revised  -     LTG 4 Progress Comments revised from 120 degrees flexion to 110 degrees  -        Time Calculation    PT Goal Re-Cert Due Date 06/05/18  -       User Key  (r) = Recorded By, (t) = Taken By, (c) = Cosigned By    Initials Name Provider Type    LINA Dickey, PT DPT Physical Therapist                PT Assessment/Plan     Row Name 05/15/18 1100          PT Assessment    Functional  "Limitations Impaired gait;Limitation in home management;Limitations in community activities;Limitations in functional capacity and performance;Performance in leisure activities;Performance in self-care ADL;Performance in work activities  -     Impairments Edema;Endurance;Gait;Range of motion;Muscle strength;Pain;Integumentary integrity  -     Assessment Comments Patient and I had another humberto conversation about his HEP compliance and the fact that life is not therapeutic. I educated him that consistent ROM will be necessary if he goes through with manipulation of the knee.  -     Rehab Potential Fair   barrier: poor HEP compliance, comorbidities,   -SS     Patient/caregiver participated in establishment of treatment plan and goals Yes  -SS     Patient would benefit from skilled therapy intervention Yes  -SS        PT Plan    PT Frequency 2x/week;3x/week  -     Predicted Duration of Therapy Intervention (PT Eval) 2-3 more weeks  -     PT Plan Comments ROM, stretching, joint mobilization, strengthening  -       User Key  (r) = Recorded By, (t) = Taken By, (c) = Cosigned By    Initials Name Provider Type     Pablo Dickey, PT DPT Physical Therapist                  Exercises     Row Name 05/15/18 1100             Subjective Comments    Subjective Comments \"Pretty good\" with HEP. Working on exercises \" 4 hours throughout the day, 30 minutes here, 30 minutes there.\" Mowing, lifting concrete bags, small mechanical work. Doing stuff to that moves him around, but not really concentrated motion. States that he's tired of the pain and considering going ahead and having the knee manipulated.  -         Subjective Pain    Able to rate subjective pain? yes  -      Pre-Treatment Pain Level 3  -      Post-Treatment Pain Level 2  -      Subjective Pain Comment \"Stiffness\"  -         Exercise 1    Exercise Name 1 Pro2, Seat 13, LE ROM  -      Cueing 1 Verbal  -      Time 1 15 mins  -      " Additional Comments Level 1   -SS         Exercise 2    Exercise Name 2 LLPS knee extension with heel prop  -SS      Cueing 2 Verbal  -SS      Time 2 3 mins  -SS      Additional Comments 2# weight on knee  -SS         Exercise 3    Exercise Name 3 Heel slide with strap  -SS      Cueing 3 Verbal  -SS      Sets 3 5  -SS      Time 3 30 sec hold  -SS         Exercise 4    Exercise Name 4 LLPS seated knee flexion with MFR over medial, lateral, and anterior knee  -SS      Cueing 4 Verbal;Tactile  -SS      Time 4 5 mins  -SS         Exercise 5    Exercise Name 5 manual therapy  -SS        User Key  (r) = Recorded By, (t) = Taken By, (c) = Cosigned By    Initials Name Provider Type    SS Pablo Dickey, PT DPT Physical Therapist           Manual Rx (last 36 hours)      Manual Treatments     Row Name 05/15/18 1100             Manual Rx 1    Manual Rx 1 Location L knee  -SS      Manual Rx 1 Type joint mobilization flexion   -SS      Manual Rx 1 Grade 3  -SS      Manual Rx 1 Duration 8 mins  -SS         Manual Rx 2    Manual Rx 2 Location L knee  -SS      Manual Rx 2 Type joint mobilization extension  -SS      Manual Rx 2 Grade 3  -SS      Manual Rx 2 Duration 2 mins  -SS        User Key  (r) = Recorded By, (t) = Taken By, (c) = Cosigned By    Initials Name Provider Type    SS Pablo Dickey, SHANTA DPT Physical Therapist                      Outcome Measure Options: Lower Extremity Functional Scale (LEFS)  Lower Extremity Functional Index  Any of your usual work, housework or school activities: A little bit of difficulty  Your usual hobbies, recreational or sporting activities: Moderate difficulty  Getting into or out of the bath: A little bit of difficulty  Walking between rooms: A little bit of difficulty  Putting on your shoes or socks: A little bit of difficulty  Squatting: A little bit of difficulty  Lifting an object, like a bag of groceries from the floor: A little bit of difficulty  Performing light  activities around your home: A little bit of difficulty  Performing heavy activities around your home: A little bit of difficulty  Getting into or out of a car: A little bit of difficulty  Walking 2 blocks: A little bit of difficulty  Walking a mile: A little bit of difficulty  Going up or down 10 stairs (about 1 flight of stairs): A little bit of difficulty  Standing for 1 hour: A little bit of difficulty  Sitting for 1 hour: A little bit of difficulty  Running on even ground: Extreme difficulty or unable to perform activity  Running on uneven ground: Extreme difficulty or unable to perform activity  Making sharp turns while running fast: Extreme difficulty or unable to perform activity  Hopping: Extreme difficulty or unable to perform activity  Rolling over in bed: A little bit of difficulty  Total: 47      Time Calculation:   Start Time: 1101  Stop Time: 1158  Time Calculation (min): 57 min     Therapy Charges for Today     Code Description Service Date Service Provider Modifiers Qty    48174784220 HC PT THER PROC EA 15 MIN 5/15/2018 Pablo Dickey PT DPT GP 3    17204567363 HC PT MANUAL THERAPY EA 15 MIN 5/15/2018 Pablo Dickey, PT DPT GP 1                   Pablo Dickey, PT, DPT, CHT  5/15/2018

## 2018-05-17 ENCOUNTER — HOSPITAL ENCOUNTER (OUTPATIENT)
Dept: PHYSICAL THERAPY | Facility: HOSPITAL | Age: 63
Setting detail: THERAPIES SERIES
Discharge: HOME | End: 2018-05-17

## 2018-05-17 DIAGNOSIS — M25.562 CHRONIC PAIN OF LEFT KNEE: ICD-10-CM

## 2018-05-17 DIAGNOSIS — G89.29 CHRONIC PAIN OF LEFT KNEE: ICD-10-CM

## 2018-05-17 DIAGNOSIS — Z96.652 TOTAL KNEE REPLACEMENT STATUS, LEFT: Primary | ICD-10-CM

## 2018-05-17 PROCEDURE — 97110 THERAPEUTIC EXERCISES: CPT

## 2018-05-17 NOTE — THERAPY TREATMENT NOTE
"    Outpatient Physical Therapy Ortho Treatment Note  [facility]     Patient Name: Heber Houston  : 1955  MRN: [id number]  Today's Date: 2018      Visit Date: 2018     Subjective Improvement 60%  Visits   Visits approved no visit limit  RTMD 6-7 weeks  Recert Date 2018    S/P Left TKA on 3-    Visit Dx:    ICD-10-CM ICD-9-CM   1. Total knee replacement status, left Z96.652 V43.65   2. Chronic pain of left knee M25.562 719.46    G89.29 338.29       Patient Active Problem List   Diagnosis   • Allergic angioedema   • Restless leg syndrome   • Neuropathic pain of both legs        Past Medical History:   Diagnosis Date   • Chronic pain disorder    • COPD (chronic obstructive pulmonary disease)    • Hypertension    • Leg pain, bilateral    • Stroke     tia        Past Surgical History:   Procedure Laterality Date   • CAROTID ARTERY ANGIOPLASTY Left    • CHOLECYSTECTOMY     • ENDOSCOPY N/A 2017    Procedure: ESOPHAGOGASTRODUODENOSCOPY;  Surgeon: Sharan Gupta DO;  Location: [facility] ENDOSCOPY;  Service:    • JOINT REPLACEMENT Left 2018    total knee             PT Ortho     Row Name 18 1100       Subjective Pain    Post-Treatment Pain Level 2  -CP       Posture/Observations    Posture/Observations Comments Independent gait sling in right UE  -CP       Left Lower Ext    Lt Knee Extension/Flexion AROM 0-02-91  -CP    Row Name 05/15/18 1100       Subjective Comments    Subjective Comments \"Pretty good\" with HEP. Working on exercises \" 4 hours throughout the day, 30 minutes here, 30 minutes there.\" Mowing, lifting concrete bags, small mechanical work. Doing stuff to that moves him around, but not really concentrated motion. States that he's tired of the pain and considering going ahead and having the knee manipulated.  -SS       Precautions and Contraindications    Precautions TKA on 3-  -SS       Subjective Pain    Able to rate subjective pain? " "yes  -SS    Pre-Treatment Pain Level 3  -SS    Post-Treatment Pain Level 2  -SS    Subjective Pain Comment \"Stiffness\"  -SS       Posture/Observations    Posture/Observations Comments Presents ambulating with STC. Ambulates in clinic without assistive device. Stiff L knee gait.  -SS       Left Lower Ext    Lt Knee Extension/Flexion AROM 0-5-85; achieves 90 deg AA flexion with strap   0-90 after manual  -SS      User Key  (r) = Recorded By, (t) = Taken By, (c) = Cosigned By    Initials Name Provider Type    SS Pablo Dickey, PT DPT Physical Therapist    CP Natasha Foy PTA Physical Therapy Assistant                            PT Assessment/Plan     Row Name 05/17/18 1505          PT Assessment    Assessment Comments slow increase in arom.  Patient does not appear to be compliant with HEP at the present time.  -CP        PT Plan    PT Frequency 2x/week;3x/week  -CP     Predicted Duration of Therapy Intervention (OT Eval) 2-3 weeks  -CP     PT Plan Comments cont to encourage patient to perform HEP  -CP       User Key  (r) = Recorded By, (t) = Taken By, (c) = Cosigned By    Initials Name Provider Type    CP Natasha Foy, ANDRADE Physical Therapy Assistant                    Exercises     Row Name 05/17/18 1100             Subjective Comments    Subjective Comments Evita dotson states he fell off his proch the other day and injuried his AC joint.  He may need to have PT on t hat.  -CP         Subjective Pain    Able to rate subjective pain? yes  -CP      Pre-Treatment Pain Level 2  -CP      Post-Treatment Pain Level 2  -CP         Aquatics    Aquatics performed? No  -CP         Exercise 1    Exercise Name 1 Pro II leve 1  -CP      Time 1 15  -CP         Exercise 2    Exercise Name 2 incline stretch  -CP      Sets 2 3  -CP      Time 2 30  -CP         Exercise 3    Exercise Name 3 cybex leg press  -CP      Sets 3 3  -CP      Reps 3 10  -CP      Time 3 90 lb  -CP         Exercise 4    Exercise Name 4 LLPS seated " knee fl stretch  -CP      Time 4 7  -CP         Exercise 5    Exercise Name 5 Manual Stretch left knee fl  -CP      Time 5 5  -CP         Exercise 6    Exercise Name 6 QS  -CP      Sets 6 3  -CP      Reps 6 10  -CP         Exercise 7    Exercise Name 7 prone hang  -CP      Reps 7 2.5 lb  -CP      Time 7 5  -CP        User Key  (r) = Recorded By, (t) = Taken By, (c) = Cosigned By    Initials Name Provider Type    CP Natasha Foy PTA Physical Therapy Assistant                               PT OP Goals     Row Name 05/17/18 1500          PT Short Term Goals    STG Date to Achieve --   additional STGs deferred  -CP     STG 1 LEFS score to be >/= 20/80  -CP     STG 1 Progress Met  -CP     STG 2 Note a >/= 25% subjective improvement  -CP     STG 2 Progress Met  -CP     STG 3 L knee active extension to -5 degrees or better  -CP     STG 3 Progress Met  -CP     STG 4 L knee active flexion to >/= 90 degrees  -CP     STG 4 Progress Not Met  -CP        Long Term Goals    LTG Date to Achieve 06/04/18   further TBA  -CP     LTG 1 Independent with HEP  -CP     LTG 1 Progress Ongoing  -CP     LTG 2 LEFS score to be >/= 60/80  -CP     LTG 2 Progress Ongoing  -CP     LTG 3 L knee active extension 0 degrees  -CP     LTG 3 Progress Ongoing  -CP     LTG 4 L knee active flexion >/= 110 degrees  -CP     LTG 4 Progress Ongoing;Goal Revised  -CP        Time Calculation    PT Goal Re-Cert Due Date 06/05/18  -CP       User Key  (r) = Recorded By, (t) = Taken By, (c) = Cosigned By    Initials Name Provider Type    CP Natasha Foy PTA Physical Therapy Assistant          Therapy Education  Given: HEP  Program: Reinforced  How Provided: Verbal, Demonstration  Provided to: Patient  Level of Understanding: Verbalized, Demonstrated              Time Calculation:   Start Time: 1055  Stop Time: 1155  Time Calculation (min): 60 min  Total Timed Code Minutes- PT: 60 minute(s)    Therapy Charges for Today     Code Description Service Date  Service Provider Modifiers Qty    99428373859 HC PT THER PROC EA 15 MIN 5/17/2018 Natasha Foy, PTA GP 4                    Natasha Foy PTA  5/17/2018

## 2018-05-22 ENCOUNTER — HOSPITAL ENCOUNTER (OUTPATIENT)
Dept: PHYSICAL THERAPY | Facility: HOSPITAL | Age: 63
Setting detail: THERAPIES SERIES
Discharge: HOME | End: 2018-05-22

## 2018-05-22 DIAGNOSIS — M25.562 CHRONIC PAIN OF LEFT KNEE: ICD-10-CM

## 2018-05-22 DIAGNOSIS — G89.29 CHRONIC PAIN OF LEFT KNEE: ICD-10-CM

## 2018-05-22 DIAGNOSIS — Z96.652 TOTAL KNEE REPLACEMENT STATUS, LEFT: Primary | ICD-10-CM

## 2018-05-22 PROCEDURE — 97110 THERAPEUTIC EXERCISES: CPT

## 2018-05-22 NOTE — THERAPY TREATMENT NOTE
Outpatient Physical Therapy Ortho Treatment Note  [facility]     Patient Name: Heber Houston  : 1955  MRN: [id number]  Today's Date: 2018      Visit Date: 2018     Subjective Improvement 60-65%  Visits   Visits approved no visit limit  RTMD 2018  Recert Date 2018    S/P Left TKA on 3-        Visit Dx:    ICD-10-CM ICD-9-CM   1. Total knee replacement status, left Z96.652 V43.65   2. Chronic pain of left knee M25.562 719.46    G89.29 338.29       Patient Active Problem List   Diagnosis   • Allergic angioedema   • Restless leg syndrome   • Neuropathic pain of both legs        Past Medical History:   Diagnosis Date   • Chronic pain disorder    • COPD (chronic obstructive pulmonary disease)    • Hypertension    • Leg pain, bilateral    • Stroke     tia        Past Surgical History:   Procedure Laterality Date   • CAROTID ARTERY ANGIOPLASTY Left    • CHOLECYSTECTOMY     • ENDOSCOPY N/A 2017    Procedure: ESOPHAGOGASTRODUODENOSCOPY;  Surgeon: Sharan Gupta DO;  Location: [facility] ENDOSCOPY;  Service:    • JOINT REPLACEMENT Left 2018    total knee             PT Ortho     Row Name 18 1000       Subjective Comments    Subjective Comments Patient reports feeling 60-65% better.  States he feels he is at a stand still and not sure how much motion he will get back.  Patient blames himself for lack of progress.  He knows he didnt do what he needed to do early in his rehab.  If he can get 100 bent, he will be happy.  Patient has to RTW in 2 weeks or he will lose his job.  Patient states that thing whole process has made him mentally depressed.  But it is nothing that he is seeking treatment for.  He believes that once he is able to do more the sadness and blaming himself will change.  -CP       Precautions and Contraindications    Precautions L TKA on 3-  -CP       Subjective Pain    Able to rate subjective pain? yes  -CP    Pre-Treatment  Pain Level 4  -CP    Post-Treatment Pain Level 4  -CP       Posture/Observations    Posture/Observations Comments independent slow gait and movement   -CP       Left Lower Ext    Lt Knee Extension/Flexion AROM 0-94  -CP      User Key  (r) = Recorded By, (t) = Taken By, (c) = Cosigned By    Initials Name Provider Type    CP Natasha Foy PTA Physical Therapy Assistant                            PT Assessment/Plan     Row Name 05/22/18 1427          PT Assessment    Assessment Comments increase in ROM this date.  -CP        PT Plan    PT Frequency 2x/week;3x/week  -CP     Predicted Duration of Therapy Intervention (OT Eval) 2-3weeks  -CP     PT Plan Comments cont with POC  -CP       User Key  (r) = Recorded By, (t) = Taken By, (c) = Cosigned By    Initials Name Provider Type    CP Natasha Foy PTA Physical Therapy Assistant                    Exercises     Row Name 05/22/18 1000             Subjective Comments    Subjective Comments Patient reports feeling 60-65% better.  States he feels he is at a stand still and not sure how much motion he will get back.  Patient blames himself for lack of progress.  He knows he didnt do what he needed to do early in his rehab.  If he can get 100 bent, he will be happy.  Patient has to RTW in 2 weeks or he will lose his job.  Patient states that thing whole process has made him mentally depressed.  But it is nothing that he is seeking treatment for.  He believes that once he is able to do more the sadness and blaming himself will change.  -CP         Subjective Pain    Able to rate subjective pain? yes  -CP      Pre-Treatment Pain Level 4  -CP      Post-Treatment Pain Level 4  -CP         Aquatics    Aquatics performed? No  -CP         Exercise 1    Exercise Name 1 Pro II level 1 rocking  -CP      Time 1 15  -CP      Additional Comments seat 14  -CP         Exercise 2    Exercise Name 2 incline stretch  -CP      Sets 2 3  -CP      Time 2 30  -CP         Exercise 3     Exercise Name 3 mini squats  -CP      Sets 3 2  -CP      Reps 3 10  -CP         Exercise 4    Exercise Name 4 cybex leg press  -CP      Sets 4 2  -CP      Reps 4 10  -CP      Time 4 95 lb  -CP         Exercise 5    Exercise Name 5 LLPS Seated knee fl stretch  -CP      Time 5 10  -CP         Exercise 6    Exercise Name 6 Manual knee fl stretch with patient supine and prone  -CP      Time 6 8  -CP         Exercise 7    Exercise Name 7 Prone hang  -CP      Time 7 5  -CP      Additional Comments 3 lb  -CP        User Key  (r) = Recorded By, (t) = Taken By, (c) = Cosigned By    Initials Name Provider Type    CP Natasha Foy PTA Physical Therapy Assistant                               PT OP Goals     Row Name 05/22/18 1400          PT Short Term Goals    STG Date to Achieve --   additional STGs deferred  -CP     STG 1 LEFS score to be >/= 20/80  -CP     STG 1 Progress Met  -CP     STG 2 Note a >/= 25% subjective improvement  -CP     STG 2 Progress Met  -CP     STG 3 L knee active extension to -5 degrees or better  -CP     STG 3 Progress Met  -CP     STG 4 L knee active flexion to >/= 90 degrees  -CP     STG 4 Progress Not Met  -CP        Long Term Goals    LTG Date to Achieve 06/04/18   further TBA  -CP     LTG 1 Independent with HEP  -CP     LTG 1 Progress Ongoing  -CP     LTG 2 LEFS score to be >/= 60/80  -CP     LTG 2 Progress Ongoing  -CP     LTG 3 L knee active extension 0 degrees  -CP     LTG 3 Progress Ongoing  -CP     LTG 4 L knee active flexion >/= 110 degrees  -CP     LTG 4 Progress Ongoing;Goal Revised  -CP        Time Calculation    PT Goal Re-Cert Due Date 06/05/18  -CP       User Key  (r) = Recorded By, (t) = Taken By, (c) = Cosigned By    Initials Name Provider Type    CP Natasha Foy PTA Physical Therapy Assistant          Therapy Education  Given: HEP  Program: Reinforced  How Provided: Verbal, Demonstration  Provided to: Patient  Level of Understanding: Verbalized, Demonstrated               Time Calculation:   Start Time: 1010  Stop Time: 1110  Time Calculation (min): 60 min  Total Timed Code Minutes- PT: 60 minute(s)    Therapy Charges for Today     Code Description Service Date Service Provider Modifiers Qty    71440718114 HC PT THER PROC EA 15 MIN 5/22/2018 Natasha Foy, PTA GP 4                    Natasha Foy PTA  5/22/2018

## 2018-05-25 ENCOUNTER — HOSPITAL ENCOUNTER (OUTPATIENT)
Dept: PHYSICAL THERAPY | Facility: HOSPITAL | Age: 63
Setting detail: THERAPIES SERIES
Discharge: HOME | End: 2018-05-25

## 2018-05-25 DIAGNOSIS — G89.29 CHRONIC PAIN OF LEFT KNEE: ICD-10-CM

## 2018-05-25 DIAGNOSIS — M25.562 CHRONIC PAIN OF LEFT KNEE: ICD-10-CM

## 2018-05-25 DIAGNOSIS — Z96.652 TOTAL KNEE REPLACEMENT STATUS, LEFT: Primary | ICD-10-CM

## 2018-05-25 PROCEDURE — 97110 THERAPEUTIC EXERCISES: CPT

## 2018-05-25 NOTE — THERAPY TREATMENT NOTE
Outpatient Physical Therapy Ortho Treatment Note  [facility]     Patient Name: Heber Houston  : 1955  MRN: [id number]  Today's Date: 2018      Visit Date: 2018     Sub imp 60-65%  Visit  (NVL)  MD 18  RE 18    Visit Dx:    ICD-10-CM ICD-9-CM   1. Total knee replacement status, left Z96.652 V43.65   2. Chronic pain of left knee M25.562 719.46    G89.29 338.29       Patient Active Problem List   Diagnosis   • Allergic angioedema   • Restless leg syndrome   • Neuropathic pain of both legs        Past Medical History:   Diagnosis Date   • Chronic pain disorder    • COPD (chronic obstructive pulmonary disease)    • Hypertension    • Leg pain, bilateral    • Stroke     tia        Past Surgical History:   Procedure Laterality Date   • CAROTID ARTERY ANGIOPLASTY Left    • CHOLECYSTECTOMY     • ENDOSCOPY N/A 2017    Procedure: ESOPHAGOGASTRODUODENOSCOPY;  Surgeon: Sharan Gupta DO;  Location: [facility] ENDOSCOPY;  Service:    • JOINT REPLACEMENT Left 2018    total knee             PT Ortho     Row Name 18 1100       Posture/Observations    Posture/Observations Comments (P)  Independent antalgic gait.   -LORI       Left Lower Ext    Lt Knee Extension/Flexion AROM (P)  0-2-85  -LORI      User Key  (r) = Recorded By, (t) = Taken By, (c) = Cosigned By    Initials Name Provider Type    LORI Gann ATC                             PT Assessment/Plan     Row Name 18 1155          PT Assessment    Assessment Comments (P)  Decreased ROM.   -LORI        PT Plan    PT Frequency (P)  2x/week;3x/week  -LORI     Predicted Duration of Therapy Intervention (OT Eval) (P)  2-3 weeks  -LORI     PT Plan Comments (P)  Cont per POC progress as able.   -LORI       User Key  (r) = Recorded By, (t) = Taken By, (c) = Cosigned By    Initials Name Provider Type    LORI Gann ATC                     Exercises     Row Name 18 1100              Subjective Comments    Subjective Comments (P)  Pain not too bad today. Just feels like knee is locked. Spent 3 hours in a truck yesterday.  -LORI         Subjective Pain    Able to rate subjective pain? (P)  yes  -LORI      Pre-Treatment Pain Level (P)  3  -LORI      Post-Treatment Pain Level (P)  3  -LORI         Aquatics    Aquatics performed? (P)  No  -LORI         Exercise 1    Exercise Name 1 (P)  Pro II level 1 rocking  -LORI      Time 1 (P)  15  -LORI      Additional Comments (P)  seat 14  -LORI         Exercise 2    Exercise Name 2 (P)  incline stretch  -LORI      Sets 2 (P)  3  -LORI      Time 2 (P)  30  -LORI         Exercise 3    Exercise Name 3 (P)  mini squats  -LORI      Sets 3 (P)  2  -LORI      Reps 3 (P)  10  -LORI         Exercise 4    Exercise Name 4 (P)  cybex leg press  -LORI      Sets 4 (P)  3  -LORI      Reps 4 (P)  10  -LORI      Time 4 (P)  95 lb  -LORI         Exercise 5    Exercise Name 5 (P)  LLPS Seated knee fl stretch  -LORI      Time 5 (P)  10  -LORI         Exercise 6    Exercise Name 6 (P)  Manual knee fl stretch with patient supine and prone  -LORI      Time 6 (P)  8  -LORI         Exercise 7    Exercise Name 7 (P)  Prone hang  -LORI      Time 7 (P)  3  -LORI      Additional Comments (P)  5#  -LORI        User Key  (r) = Recorded By, (t) = Taken By, (c) = Cosigned By    Initials Name Provider Type    LORI Gann, ATC                                PT OP Goals     Row Name 05/25/18 1000          PT Short Term Goals    STG Date to Achieve (P)  --   additional STGs deferred  -LORI     STG 1 (P)  LEFS score to be >/= 20/80  -LORI     STG 1 Progress (P)  Met  -LORI     STG 2 (P)  Note a >/= 25% subjective improvement  -LORI     STG 2 Progress (P)  Met  -LORI     STG 3 (P)  L knee active extension to -5 degrees or better  -LORI     STG 3 Progress (P)  Met  -LORI     STG 4 (P)  L knee active flexion to >/= 90 degrees  -     STG 4 Progress (P)  Not Met  -        Long Term Goals    LTG Date to Achieve (P)  06/04/18   further  TBA  -LORI     LTG 1 (P)  Independent with HEP  -LORI     LTG 1 Progress (P)  Ongoing  -LORI     LTG 2 (P)  LEFS score to be >/= 60/80  -LORI     LTG 2 Progress (P)  Ongoing  -LORI     LTG 3 (P)  L knee active extension 0 degrees  -LORI     LTG 3 Progress (P)  Ongoing  -LORI     LTG 4 (P)  L knee active flexion >/= 110 degrees  -LORI     LTG 4 Progress (P)  Ongoing;Goal Revised  -       User Key  (r) = Recorded By, (t) = Taken By, (c) = Cosigned By    Initials Name Provider Type    LORI Gann, ATC           Therapy Education  Given: (P) HEP  Program: (P) Reinforced  How Provided: (P) Verbal  Provided to: (P) Patient  Level of Understanding: (P) Verbalized              Time Calculation:   Start Time: (P) 1100  Stop Time: (P) 1157  Time Calculation (min): (P) 57 min  Total Timed Code Minutes- PT: (P) 57 minute(s)    Therapy Charges for Today     Code Description Service Date Service Provider Modifiers Qty    91474817383 HC PT THER PROC EA 15 MIN 5/25/2018 Calvin Gann ATC  4                    Calvin Gann ATC  5/25/2018

## 2018-05-30 ENCOUNTER — HOSPITAL ENCOUNTER (OUTPATIENT)
Dept: PHYSICAL THERAPY | Facility: HOSPITAL | Age: 63
Setting detail: THERAPIES SERIES
Discharge: HOME | End: 2018-05-30

## 2018-05-30 DIAGNOSIS — G89.29 CHRONIC PAIN OF LEFT KNEE: ICD-10-CM

## 2018-05-30 DIAGNOSIS — M25.562 CHRONIC PAIN OF LEFT KNEE: ICD-10-CM

## 2018-05-30 DIAGNOSIS — Z96.652 TOTAL KNEE REPLACEMENT STATUS, LEFT: Primary | ICD-10-CM

## 2018-05-30 NOTE — THERAPY TREATMENT NOTE
Outpatient Physical Therapy Ortho Treatment Note  [facility]     Patient Name: Heber Houston  : 1955  MRN: [id number]  Today's Date: 2018      Visit Date: 2018      No treatment given today, Patient placed on hold    Subjective Improvement 60-65%  Visits   RTMD 2018  Recert Date 2018    Visit Dx:    ICD-10-CM ICD-9-CM   1. Total knee replacement status, left Z96.652 V43.65   2. Chronic pain of left knee M25.562 719.46    G89.29 338.29       Patient Active Problem List   Diagnosis   • Allergic angioedema   • Restless leg syndrome   • Neuropathic pain of both legs        Past Medical History:   Diagnosis Date   • Chronic pain disorder    • COPD (chronic obstructive pulmonary disease)    • Hypertension    • Leg pain, bilateral    • Stroke     tia        Past Surgical History:   Procedure Laterality Date   • CAROTID ARTERY ANGIOPLASTY Left    • CHOLECYSTECTOMY     • ENDOSCOPY N/A 2017    Procedure: ESOPHAGOGASTRODUODENOSCOPY;  Surgeon: Sharan Gupta DO;  Location: [facility] ENDOSCOPY;  Service:    • JOINT REPLACEMENT Left 2018    total knee             PT Ortho     Row Name 18 1100       Subjective Comments    Subjective Comments Patient stated that he feels he thinks he needs to go elsewhere for his therapy.  He is thankful for everything we have done but feels he needs a new opinion with his knee.    -CP      User Key  (r) = Recorded By, (t) = Taken By, (c) = Cosigned By    Initials Name Provider Type    LOLY Foy PTA Physical Therapy Assistant                            PT Assessment/Plan     Row Name 18 1157          PT Assessment    Assessment Comments Patient was given his PT order and instructed that he should do what is best for him.  He was told that we will be here for him if he decides to come back for therapy.  -CP       User Key  (r) = Recorded By, (t) = Taken By, (c) = Cosigned By    Initials Name Provider Type     CP Natasha Foy PTA Physical Therapy Assistant                    Exercises     Row Name 05/30/18 1100             Subjective Comments    Subjective Comments Patient stated that he feels he thinks he needs to go elsewhere for his therapy.  He is thankful for everything we have done but feels he needs a new opinion with his knee.    -CP        User Key  (r) = Recorded By, (t) = Taken By, (c) = Cosigned By    Initials Name Provider Type    CP Natasha Foy PTA Physical Therapy Assistant                                            Time Calculation:   Start Time: 0840  Stop Time: 0850  Time Calculation (min): 10 min    Therapy Charges for Today     Code Description Service Date Service Provider Modifiers Qty    73153071705 HC PT THER SUPP EA 15 MIN 5/30/2018 Natasha Foy PTA GP 1                    Natasha Foy PTA  5/30/2018

## 2018-05-31 ENCOUNTER — APPOINTMENT (OUTPATIENT)
Dept: PHYSICAL THERAPY | Facility: HOSPITAL | Age: 63
End: 2018-05-31

## 2018-07-02 ENCOUNTER — DOCUMENTATION (OUTPATIENT)
Dept: PHYSICAL THERAPY | Facility: HOSPITAL | Age: 63
End: 2018-07-02

## 2018-07-02 DIAGNOSIS — G89.29 CHRONIC PAIN OF LEFT KNEE: Primary | ICD-10-CM

## 2018-07-02 DIAGNOSIS — M25.562 CHRONIC PAIN OF LEFT KNEE: Primary | ICD-10-CM

## 2018-07-02 DIAGNOSIS — Z96.652 TOTAL KNEE REPLACEMENT STATUS, LEFT: ICD-10-CM

## 2018-08-07 ENCOUNTER — OFFICE VISIT (OUTPATIENT)
Dept: ORTHOPEDIC SURGERY | Facility: CLINIC | Age: 63
End: 2018-08-07

## 2018-08-07 VITALS — BODY MASS INDEX: 26.3 KG/M2 | WEIGHT: 216 LBS | HEIGHT: 76 IN

## 2018-08-07 DIAGNOSIS — M25.511 ACUTE PAIN OF RIGHT SHOULDER: Primary | ICD-10-CM

## 2018-08-07 DIAGNOSIS — S43.101A: ICD-10-CM

## 2018-08-07 DIAGNOSIS — I10 ESSENTIAL HYPERTENSION: ICD-10-CM

## 2018-08-07 PROCEDURE — 99203 OFFICE O/P NEW LOW 30 MIN: CPT | Performed by: ORTHOPAEDIC SURGERY

## 2018-08-07 RX ORDER — ATORVASTATIN CALCIUM 10 MG/1
10 TABLET, FILM COATED ORAL NIGHTLY
COMMUNITY

## 2018-08-07 NOTE — PROGRESS NOTES
Heber Houston is a 62 y.o. male   Primary provider:  Yasmani Alberto Jr., MD       Chief Complaint   Patient presents with   • Right Shoulder - Shoulder Pain       HISTORY OF PRESENT ILLNESS: fell off his back deck landing on his right shoulder. Patient was seen at [facility] by Valente Marquez PA-C on 5/25/2018.     Patient states that he had a knee replacement done in March in [place].  Patient states that he fell and hurt his shoulder about 2 months ago.  He denies any numbness or tingling.  Work he has to lift close from about waist level chest high level times.  Patient wants to see about having surgery on his shoulder to fix his shoulder.  He reports no problems with his knee replacement.    Shoulder Injury    The right shoulder is affected. The incident occurred more than 1 week ago. The injury mechanism was a fall. The quality of the pain is described as stabbing and aching. The pain does not radiate. The pain is moderate. Exacerbated by: driving. He has tried ice for the symptoms.        CONCURRENT MEDICAL HISTORY:    Past Medical History:   Diagnosis Date   • Chronic pain disorder    • COPD (chronic obstructive pulmonary disease) (CMS/[facility])    • Hypertension    • Leg pain, bilateral    • Stroke (CMS/[facility]) 2012    tia       Allergies   Allergen Reactions   • Lisinopril Swelling   • Lisinopril-Hydrochlorothiazide Swelling         Current Outpatient Prescriptions:   •  amLODIPine (NORVASC) 5 MG tablet, Take 5 mg by mouth Daily., Disp: , Rfl:   •  atorvastatin (LIPITOR) 10 MG tablet, Take 10 mg by mouth Daily., Disp: , Rfl:   •  clopidogrel (PLAVIX) 75 MG tablet, Take 75 mg by mouth Daily., Disp: , Rfl:   •  EPINEPHrine (EPIPEN 2-ELIDIA) 0.3 MG/0.3ML solution auto-injector injection, Inject 0.3 mL under the skin As Needed (anaphylaxis)., Disp: 1 each, Rfl: 1  •  raNITIdine (ZANTAC) 150 MG tablet, Take 150 mg by mouth 2 (Two) Times a Day., Disp: , Rfl:     Past Surgical History:   Procedure Laterality  "Date   • CAROTID ARTERY ANGIOPLASTY Left 2012   • CHOLECYSTECTOMY     • ENDOSCOPY N/A 6/19/2017    Procedure: ESOPHAGOGASTRODUODENOSCOPY;  Surgeon: Sharan Gupta DO;  Location: [facility] ENDOSCOPY;  Service:    • JOINT REPLACEMENT Left 03/26/2018    total knee       Family History   Problem Relation Age of Onset   • Coronary artery disease Mother    • Hypertension Mother    • Coronary artery disease Father    • Hypertension Father         Social History     Social History   • Marital status:      Spouse name: N/A   • Number of children: N/A   • Years of education: N/A     Occupational History   • Not on file.     Social History Main Topics   • Smoking status: Current Every Day Smoker     Packs/day: 1.00     Years: 44.00     Types: Cigarettes   • Smokeless tobacco: Never Used      Comment: recently stopped   • Alcohol use 3.0 oz/week     5 Cans of beer per week      Comment: 6 PK/WK   • Drug use: Unknown   • Sexual activity: Defer     Other Topics Concern   • Not on file     Social History Narrative   • No narrative on file        Review of Systems   Respiratory:        Breathing difficulties    Cardiovascular: Negative.    Gastrointestinal: Negative.    Musculoskeletal:        Right shoulder pain    All other systems reviewed and are negative.      PHYSICAL EXAMINATION:       Ht 193 cm (76\")   Wt 98 kg (216 lb)   BMI 26.29 kg/m²     Physical Exam   Constitutional: He is oriented to person, place, and time. He appears well-developed and well-nourished.   Neurological: He is alert and oriented to person, place, and time.   Psychiatric: He has a normal mood and affect. His behavior is normal. Judgment and thought content normal.       GAIT:     []  Normal  [x]  Antalgic    Assistive device: [x]  None  []  Walker     []  Crutches  []  Cane     []  Wheelchair  []  Stretcher    Right Shoulder Exam     Muscle Strength   Subscapularis: 4/5     Tests   Hawkin's test: positive  Impingement: positive    Other "   Erythema: absent  Sensation: normal  Pulse: present      Left Shoulder Exam     Tenderness   The patient is experiencing no tenderness.         Range of Motion   The patient has normal left shoulder ROM.    Muscle Strength   The patient has normal left shoulder strength.    Tests   Apprehension: negative  Hawkin's test: negative  Impingement: negative    Other   Erythema: absent  Sensation: normal  Pulse: present                 X-ray shoulder right 2 or more views5/15/2018  [facility]  Result Impression      Negative.    [id number]   Result Narrative   Indication:  Fell and injured right shoulder.    Right Shoulder, Two Views:  There is 1.3 cm of AC separation that looks old rather than acute.  The glenohumeral joint is normally aligned and no fracture or other bony abnormality.  No rotator cuff calcium.           ASSESSMENT:    Diagnoses and all orders for this visit:    Acute pain of right shoulder    Separation of AC joint, right, initial encounter    Essential hypertension    Other orders  -     atorvastatin (LIPITOR) 10 MG tablet; Take 10 mg by mouth Daily.          PLAN    We discussed continuing with activity as tolerated.  We discussed that many type III acromioclavicular joint dislocations do not require surgical intervention.  Slowly progress activity as tolerated.  Recheck in 6 weeks with repeat x-rays to see what has changed.  We also discussed physical therapy, however, the patient stated he did not want to go to physical therapy and he will do the exercises on his own.    Return in about 6 weeks (around 9/18/2018) for Recheck with repeat xrays with and without weights..    Chance Lee MD

## 2018-09-17 DIAGNOSIS — S43.101A: Primary | ICD-10-CM

## 2018-09-18 ENCOUNTER — OFFICE VISIT (OUTPATIENT)
Dept: ORTHOPEDIC SURGERY | Facility: CLINIC | Age: 63
End: 2018-09-18

## 2018-09-18 VITALS — HEIGHT: 76 IN | BODY MASS INDEX: 26.91 KG/M2 | WEIGHT: 221 LBS

## 2018-09-18 DIAGNOSIS — M25.511 ACUTE PAIN OF RIGHT SHOULDER: ICD-10-CM

## 2018-09-18 DIAGNOSIS — G57.93 NEUROPATHIC PAIN OF BOTH LEGS: ICD-10-CM

## 2018-09-18 DIAGNOSIS — Z86.73 HISTORY OF STROKE IN ADULTHOOD: ICD-10-CM

## 2018-09-18 DIAGNOSIS — I10 ESSENTIAL HYPERTENSION: ICD-10-CM

## 2018-09-18 DIAGNOSIS — S43.101D AC SEPARATION, TYPE 3, RIGHT, SUBSEQUENT ENCOUNTER: Primary | ICD-10-CM

## 2018-09-18 PROCEDURE — 99214 OFFICE O/P EST MOD 30 MIN: CPT | Performed by: ORTHOPAEDIC SURGERY

## 2018-09-18 NOTE — PROGRESS NOTES
Heber Houston is a 62 y.o. male returns for     Chief Complaint   Patient presents with   • Right Clavicle - Follow-up       HISTORY OF PRESENT ILLNESS:  F/u on rt clavicle, patient had xrays today, patient brought MRI disic with him today had MRI 1 week ago,  Is having constant pain.  He has occasional sharp pains with certain activities.  His pain is not improving.  He denies numbness or tingling.  He does have pain at night that keeps him awake.     CONCURRENT MEDICAL HISTORY:    Past Medical History:   Diagnosis Date   • Chronic pain disorder    • COPD (chronic obstructive pulmonary disease) (CMS/HCC)    • Hypertension    • Leg pain, bilateral    • Stroke (CMS/HCC) 2012    tia     Family History   Problem Relation Age of Onset   • Coronary artery disease Mother    • Hypertension Mother    • Coronary artery disease Father    • Hypertension Father      Past Surgical History:   Procedure Laterality Date   • CAROTID ARTERY ANGIOPLASTY Left 2012   • CHOLECYSTECTOMY     • ENDOSCOPY N/A 6/19/2017    Procedure: ESOPHAGOGASTRODUODENOSCOPY;  Surgeon: Sharan Gupta DO;  Location: [facility] ENDOSCOPY;  Service:    • JOINT REPLACEMENT Left 03/26/2018    total knee     Current Outpatient Prescriptions on File Prior to Visit   Medication Sig Dispense Refill   • amLODIPine (NORVASC) 5 MG tablet Take 5 mg by mouth Daily.     • atorvastatin (LIPITOR) 10 MG tablet Take 10 mg by mouth Daily.     • clopidogrel (PLAVIX) 75 MG tablet Take 75 mg by mouth Daily.     • EPINEPHrine (EPIPEN 2-ELIDIA) 0.3 MG/0.3ML solution auto-injector injection Inject 0.3 mL under the skin As Needed (anaphylaxis). 1 each 1   • raNITIdine (ZANTAC) 150 MG tablet Take 150 mg by mouth 2 (Two) Times a Day.       Allergies   Allergen Reactions   • Lisinopril Swelling   • Lisinopril-Hydrochlorothiazide Swelling     Social History   Substance Use Topics   • Smoking status: Current Every Day Smoker     Packs/day: 1.00     Years: 44.00     Types: Cigarettes   •  "Smokeless tobacco: Never Used      Comment: recently stopped   • Alcohol use 3.0 oz/week     5 Cans of beer per week      Comment: 6 PK/WK         ROS  No fevers or chills.  No chest pain or shortness of air.  No GI or  disturbances.  All other systems reported as negative except right shoulder pain.    PHYSICAL EXAMINATION:       Ht 193 cm (76\")   Wt 100 kg (221 lb)   BMI 26.90 kg/m²     Physical Exam   Constitutional: He is oriented to person, place, and time. He appears well-developed and well-nourished. No distress.   Cardiovascular: Normal rate, regular rhythm and normal heart sounds.    Pulmonary/Chest: Effort normal and breath sounds normal.   Abdominal: Soft. Bowel sounds are normal.   Neurological: He is alert and oriented to person, place, and time.   Psychiatric: He has a normal mood and affect. His behavior is normal. Judgment and thought content normal.   Vitals reviewed.      GAIT:     [x]  Normal  []  Antalgic    Assistive device: [x]  None  []  Walker     []  Crutches  []  Cane     []  Wheelchair  []  Stretcher    Right Shoulder Exam     Tenderness   Right shoulder tenderness location: Very tender over the acromioclavicular joint.    Range of Motion   Active Abduction: 70   Forward Flexion: 80     Muscle Strength   Abduction: 4/5   Subscapularis: 4/5     Tests   Hawkin's test: positive  Impingement: positive    Other   Erythema: absent  Sensation: normal  Pulse: present      Left Shoulder Exam     Tenderness   The patient is experiencing no tenderness.         Range of Motion   The patient has normal left shoulder ROM.    Muscle Strength   The patient has normal left shoulder strength.    Tests   Apprehension: negative  Hawkin's test: negative  Impingement: negative    Other   Erythema: absent  Sensation: normal  Pulse: present               Xr Acromioclavicular Joints Bilateral With & Without Weights    Result Date: 9/19/2018  Narrative: Ordering Provider:  Chance Lee MD Ordering " Diagnosis/Indication:  Separation of AC joint, right, initial encounter Procedure:  XR ACROMIOCLAVICULAR JOINTS BILATERAL W WO WEIGHTS Exam Date:  9/18/18 COMPARISON:  Not applicable, no relevant images available.     Impression:  AP of bilateral clavicles and acromioclavicular joint's with and without weights show that he has severe osteoarthritic changes noted in the acromioclavicular joint on the left and shows a grade 3 acromioclavicular joint separation on the right.  No significant change with the weights. No acute bony abnormality is noted. Chance Lee MD 9/18/18             ASSESSMENT:    Diagnoses and all orders for this visit:    AC separation, type 3, right, subsequent encounter  -     Case Request; Standing  -     ceFAZolin (ANCEF) 2 g in sodium chloride 0.9 % 100 mL IVPB; Infuse 2 g into a venous catheter 1 (One) Time.  -     Case Request    Acute pain of right shoulder  -     Case Request; Standing  -     ceFAZolin (ANCEF) 2 g in sodium chloride 0.9 % 100 mL IVPB; Infuse 2 g into a venous catheter 1 (One) Time.  -     Case Request    Neuropathic pain of both legs  -     Case Request; Standing  -     ceFAZolin (ANCEF) 2 g in sodium chloride 0.9 % 100 mL IVPB; Infuse 2 g into a venous catheter 1 (One) Time.  -     Case Request    Essential hypertension  -     Case Request; Standing  -     ceFAZolin (ANCEF) 2 g in sodium chloride 0.9 % 100 mL IVPB; Infuse 2 g into a venous catheter 1 (One) Time.  -     Case Request    History of stroke in adulthood  -     Case Request; Standing  -     ceFAZolin (ANCEF) 2 g in sodium chloride 0.9 % 100 mL IVPB; Infuse 2 g into a venous catheter 1 (One) Time.  -     Case Request    Other orders  -     Follow Anesthesia Guidelines / Standing Orders; Future  -     Follow Anesthesia Guidelines / Standing Orders; Standing  -     Verify NPO Status; Standing  -     Obtain informed consent (if not collected inpatient or PAT); Standing          PLAN    The patient voiced  understanding of the risks, benefits, and alternative forms of treatment that were discussed and the patient consents to proceed with surgery.  All risks, benefits and alternatives were discussed.  Risks including to but not exclusive to anesthetic complications, including death, MI, CVA, infection, bleeding DVT, fracture, residual pain and need for future surgery.  This discussion was held with the patient by Chance Lee MD and all questions were answered.    Plan AC joint reconstruction with dogbone implant.    Stop plavix 5 days prior to surgery.        Patient's Body mass index is 26.9 kg/m². BMI is above normal parameters. Recommendations include: exercise counseling and nutrition counseling.    Return for Post-operative eval.    Chance Lee MD

## 2018-09-19 PROBLEM — I10 ESSENTIAL HYPERTENSION: Status: ACTIVE | Noted: 2018-09-19

## 2018-09-19 RX ORDER — BUPIVACAINE HCL/0.9 % NACL/PF 0.1 %
2 PLASTIC BAG, INJECTION (ML) EPIDURAL ONCE
Status: CANCELLED | OUTPATIENT
Start: 2018-10-08 | End: 2018-10-08

## 2018-09-28 PROBLEM — S43.101D: Status: ACTIVE | Noted: 2018-09-28

## 2018-09-28 PROBLEM — Z86.73 HISTORY OF STROKE IN ADULTHOOD: Status: ACTIVE | Noted: 2018-09-28

## 2018-10-03 ENCOUNTER — APPOINTMENT (OUTPATIENT)
Dept: PREADMISSION TESTING | Facility: HOSPITAL | Age: 63
End: 2018-10-03

## 2018-10-03 VITALS
RESPIRATION RATE: 16 BRPM | SYSTOLIC BLOOD PRESSURE: 134 MMHG | WEIGHT: 222 LBS | DIASTOLIC BLOOD PRESSURE: 80 MMHG | OXYGEN SATURATION: 95 % | HEIGHT: 76 IN | HEART RATE: 83 BPM | BODY MASS INDEX: 27.03 KG/M2

## 2018-10-03 LAB — MRSA DNA SPEC QL NAA+PROBE: NEGATIVE

## 2018-10-03 PROCEDURE — 87641 MR-STAPH DNA AMP PROBE: CPT | Performed by: ORTHOPAEDIC SURGERY

## 2018-10-03 PROCEDURE — 93005 ELECTROCARDIOGRAM TRACING: CPT

## 2018-10-03 PROCEDURE — 93010 ELECTROCARDIOGRAM REPORT: CPT | Performed by: INTERNAL MEDICINE

## 2018-10-03 RX ORDER — SODIUM CHLORIDE, SODIUM GLUCONATE, SODIUM ACETATE, POTASSIUM CHLORIDE, AND MAGNESIUM CHLORIDE 526; 502; 368; 37; 30 MG/100ML; MG/100ML; MG/100ML; MG/100ML; MG/100ML
1000 INJECTION, SOLUTION INTRAVENOUS CONTINUOUS
Status: CANCELLED | OUTPATIENT
Start: 2018-10-08

## 2018-10-03 RX ORDER — ALBUTEROL SULFATE 90 UG/1
2 AEROSOL, METERED RESPIRATORY (INHALATION) EVERY 4 HOURS PRN
COMMUNITY

## 2018-10-03 NOTE — DISCHARGE INSTRUCTIONS
[facility]  Pre-op Information and Guidelines    You will be called after 2 p.m. the day before your surgery (Friday for Monday surgery) and notified of your time for arrival and approximate surgery time.  If you have not received a call by 4P.M., please contact Same Day Surgery at [phone] of if outside [facility] call [phone].    Please Follow these Important Safety Guidelines:    • The morning of your procedure, take only the medications listed below with   A sip of water:_____________________________________________       ____INHALERS, ZANTAC________________________    • DO NOT eat or drink anything after 12:00 midnight the night before surgery  Specific instructions concerning drinking clear liquids will be discussed during  the pre-surgery instruction call the day before your surgery.    • If you take a blood thinner (ex. Plavix, Coumadin, aspirin), ask your doctor when to stop it before surgery  STOP DATE: _________________    • Only 2 visitors are allowed in patient rooms at a time  Your visitors will be asked to wait in the lobby until the admission process is complete with the exception of a parent with a child and patients in need of special assistance.    • YOU CANNOT DRIVE YOURSELF HOME  You must be accompanied by someone who will be responsible for driving you home after surgery and for your care at home.    • DO NOT chew gum, use breath mints, hard candy, or smoke the day of surgery  • DO NOT drink alcohol for at least 24 hours before your surgery  • DO NOT wear any jewelry and remove all body piercing before coming to the hospital  • DO NOT wear make-up to the hospital  • If you are having surgery on an extremity (arm/leg/foot) remove nail polish/artificial nails on the surgical side  • Clothing, glasses, contacts, dentures, and hairpieces must be removed before surgery  • Bathe the night before or the morning of your surgery and do not use powders/lotions on  skin.

## 2018-10-07 ENCOUNTER — ANESTHESIA EVENT (OUTPATIENT)
Dept: PERIOP | Facility: HOSPITAL | Age: 63
End: 2018-10-07

## 2018-10-08 ENCOUNTER — HOSPITAL ENCOUNTER (OUTPATIENT)
Facility: HOSPITAL | Age: 63
Setting detail: HOSPITAL OUTPATIENT SURGERY
Discharge: HOME | End: 2018-10-08
Attending: ORTHOPAEDIC SURGERY | Admitting: ORTHOPAEDIC SURGERY

## 2018-10-08 ENCOUNTER — ANESTHESIA (OUTPATIENT)
Dept: PERIOP | Facility: HOSPITAL | Age: 63
End: 2018-10-08

## 2018-10-08 ENCOUNTER — APPOINTMENT (OUTPATIENT)
Dept: GENERAL RADIOLOGY | Facility: HOSPITAL | Age: 63
End: 2018-10-08

## 2018-10-08 VITALS
DIASTOLIC BLOOD PRESSURE: 75 MMHG | OXYGEN SATURATION: 96 % | TEMPERATURE: 96.9 F | WEIGHT: 219.58 LBS | SYSTOLIC BLOOD PRESSURE: 136 MMHG | HEIGHT: 76 IN | HEART RATE: 75 BPM | RESPIRATION RATE: 20 BRPM | BODY MASS INDEX: 26.74 KG/M2

## 2018-10-08 DIAGNOSIS — Z86.73 HISTORY OF STROKE IN ADULTHOOD: ICD-10-CM

## 2018-10-08 DIAGNOSIS — G57.93 NEUROPATHIC PAIN OF BOTH LEGS: ICD-10-CM

## 2018-10-08 DIAGNOSIS — M25.511 ACUTE PAIN OF RIGHT SHOULDER: ICD-10-CM

## 2018-10-08 DIAGNOSIS — I10 ESSENTIAL HYPERTENSION: ICD-10-CM

## 2018-10-08 DIAGNOSIS — S43.101D AC SEPARATION, TYPE 3, RIGHT, SUBSEQUENT ENCOUNTER: Primary | ICD-10-CM

## 2018-10-08 PROCEDURE — C1713 ANCHOR/SCREW BN/BN,TIS/BN: HCPCS | Performed by: ORTHOPAEDIC SURGERY

## 2018-10-08 PROCEDURE — 25010000002 PROPOFOL 10 MG/ML EMULSION: Performed by: NURSE ANESTHETIST, CERTIFIED REGISTERED

## 2018-10-08 PROCEDURE — 25010000002 SUCCINYLCHOLINE PER 20 MG: Performed by: NURSE ANESTHETIST, CERTIFIED REGISTERED

## 2018-10-08 PROCEDURE — 25010000002 ONDANSETRON PER 1 MG: Performed by: NURSE ANESTHETIST, CERTIFIED REGISTERED

## 2018-10-08 PROCEDURE — 73020 X-RAY EXAM OF SHOULDER: CPT

## 2018-10-08 PROCEDURE — 25010000002 MIDAZOLAM PER 1 MG: Performed by: NURSE ANESTHETIST, CERTIFIED REGISTERED

## 2018-10-08 PROCEDURE — 25010000002 FENTANYL CITRATE (PF) 100 MCG/2ML SOLUTION: Performed by: NURSE ANESTHETIST, CERTIFIED REGISTERED

## 2018-10-08 PROCEDURE — 23552 OPTX ACRCLV DSLC AQ/CHRN GRF: CPT | Performed by: ORTHOPAEDIC SURGERY

## 2018-10-08 PROCEDURE — 25010000002 DEXAMETHASONE PER 1 MG: Performed by: NURSE ANESTHETIST, CERTIFIED REGISTERED

## 2018-10-08 PROCEDURE — 25010000002 PHENYLEPHRINE PER 1 ML: Performed by: NURSE ANESTHETIST, CERTIFIED REGISTERED

## 2018-10-08 PROCEDURE — 25010000002 HYDROMORPHONE 1 MG/ML SOLUTION: Performed by: NURSE ANESTHETIST, CERTIFIED REGISTERED

## 2018-10-08 DEVICE — SYS KNOTLSS AC DOGBONE/BUTN IMP: Type: IMPLANTABLE DEVICE | Status: FUNCTIONAL

## 2018-10-08 RX ORDER — FENTANYL CITRATE 50 UG/ML
INJECTION, SOLUTION INTRAMUSCULAR; INTRAVENOUS AS NEEDED
Status: DISCONTINUED | OUTPATIENT
Start: 2018-10-08 | End: 2018-10-08 | Stop reason: SURG

## 2018-10-08 RX ORDER — SUCCINYLCHOLINE CHLORIDE 20 MG/ML
INJECTION INTRAMUSCULAR; INTRAVENOUS AS NEEDED
Status: DISCONTINUED | OUTPATIENT
Start: 2018-10-08 | End: 2018-10-08 | Stop reason: SURG

## 2018-10-08 RX ORDER — ROCURONIUM BROMIDE 10 MG/ML
INJECTION, SOLUTION INTRAVENOUS AS NEEDED
Status: DISCONTINUED | OUTPATIENT
Start: 2018-10-08 | End: 2018-10-08 | Stop reason: SURG

## 2018-10-08 RX ORDER — DEXAMETHASONE SODIUM PHOSPHATE 4 MG/ML
INJECTION, SOLUTION INTRA-ARTICULAR; INTRALESIONAL; INTRAMUSCULAR; INTRAVENOUS; SOFT TISSUE AS NEEDED
Status: DISCONTINUED | OUTPATIENT
Start: 2018-10-08 | End: 2018-10-08 | Stop reason: SURG

## 2018-10-08 RX ORDER — MEPERIDINE HYDROCHLORIDE 50 MG/ML
12.5 INJECTION INTRAMUSCULAR; INTRAVENOUS; SUBCUTANEOUS
Status: DISCONTINUED | OUTPATIENT
Start: 2018-10-08 | End: 2018-10-08 | Stop reason: HOSPADM

## 2018-10-08 RX ORDER — ONDANSETRON 2 MG/ML
4 INJECTION INTRAMUSCULAR; INTRAVENOUS ONCE AS NEEDED
Status: DISCONTINUED | OUTPATIENT
Start: 2018-10-08 | End: 2018-10-08 | Stop reason: HOSPADM

## 2018-10-08 RX ORDER — NALOXONE HCL 0.4 MG/ML
0.2 VIAL (ML) INJECTION AS NEEDED
Status: DISCONTINUED | OUTPATIENT
Start: 2018-10-08 | End: 2018-10-08 | Stop reason: HOSPADM

## 2018-10-08 RX ORDER — BUPIVACAINE HCL/0.9 % NACL/PF 0.1 %
2 PLASTIC BAG, INJECTION (ML) EPIDURAL ONCE
Status: COMPLETED | OUTPATIENT
Start: 2018-10-08 | End: 2018-10-08

## 2018-10-08 RX ORDER — PROPOFOL 10 MG/ML
VIAL (ML) INTRAVENOUS AS NEEDED
Status: DISCONTINUED | OUTPATIENT
Start: 2018-10-08 | End: 2018-10-08 | Stop reason: SURG

## 2018-10-08 RX ORDER — HYDROCODONE BITARTRATE AND ACETAMINOPHEN 7.5; 325 MG/1; MG/1
1 TABLET ORAL EVERY 4 HOURS PRN
Qty: 40 TABLET | Refills: 0 | Status: SHIPPED | OUTPATIENT
Start: 2018-10-08 | End: 2018-12-20

## 2018-10-08 RX ORDER — 0.9 % SODIUM CHLORIDE 0.9 %
VIAL (ML) INJECTION AS NEEDED
Status: DISCONTINUED | OUTPATIENT
Start: 2018-10-08 | End: 2018-10-08 | Stop reason: HOSPADM

## 2018-10-08 RX ORDER — LABETALOL HYDROCHLORIDE 5 MG/ML
5 INJECTION, SOLUTION INTRAVENOUS
Status: DISCONTINUED | OUTPATIENT
Start: 2018-10-08 | End: 2018-10-08 | Stop reason: HOSPADM

## 2018-10-08 RX ORDER — LIDOCAINE HYDROCHLORIDE 20 MG/ML
INJECTION, SOLUTION INFILTRATION; PERINEURAL AS NEEDED
Status: DISCONTINUED | OUTPATIENT
Start: 2018-10-08 | End: 2018-10-08 | Stop reason: SURG

## 2018-10-08 RX ORDER — ALBUTEROL SULFATE 2.5 MG/3ML
2.5 SOLUTION RESPIRATORY (INHALATION) ONCE
Status: COMPLETED | OUTPATIENT
Start: 2018-10-08 | End: 2018-10-08

## 2018-10-08 RX ORDER — ONDANSETRON 2 MG/ML
INJECTION INTRAMUSCULAR; INTRAVENOUS AS NEEDED
Status: DISCONTINUED | OUTPATIENT
Start: 2018-10-08 | End: 2018-10-08 | Stop reason: SURG

## 2018-10-08 RX ORDER — HYDROCODONE BITARTRATE AND ACETAMINOPHEN 7.5; 325 MG/1; MG/1
1 TABLET ORAL ONCE AS NEEDED
Status: CANCELLED | OUTPATIENT
Start: 2018-10-08 | End: 2018-10-10

## 2018-10-08 RX ORDER — DIPHENHYDRAMINE HYDROCHLORIDE 50 MG/ML
12.5 INJECTION INTRAMUSCULAR; INTRAVENOUS
Status: DISCONTINUED | OUTPATIENT
Start: 2018-10-08 | End: 2018-10-08 | Stop reason: HOSPADM

## 2018-10-08 RX ORDER — MIDAZOLAM HYDROCHLORIDE 1 MG/ML
INJECTION INTRAMUSCULAR; INTRAVENOUS AS NEEDED
Status: DISCONTINUED | OUTPATIENT
Start: 2018-10-08 | End: 2018-10-08 | Stop reason: SURG

## 2018-10-08 RX ORDER — FLUMAZENIL 0.1 MG/ML
0.2 INJECTION INTRAVENOUS AS NEEDED
Status: DISCONTINUED | OUTPATIENT
Start: 2018-10-08 | End: 2018-10-08 | Stop reason: HOSPADM

## 2018-10-08 RX ORDER — ACETAMINOPHEN 650 MG/1
650 SUPPOSITORY RECTAL ONCE AS NEEDED
Status: DISCONTINUED | OUTPATIENT
Start: 2018-10-08 | End: 2018-10-08 | Stop reason: HOSPADM

## 2018-10-08 RX ORDER — EPHEDRINE SULFATE 50 MG/ML
5 INJECTION, SOLUTION INTRAVENOUS ONCE AS NEEDED
Status: DISCONTINUED | OUTPATIENT
Start: 2018-10-08 | End: 2018-10-08 | Stop reason: HOSPADM

## 2018-10-08 RX ORDER — BUPIVACAINE HYDROCHLORIDE AND EPINEPHRINE 2.5; 5 MG/ML; UG/ML
INJECTION, SOLUTION EPIDURAL; INFILTRATION; INTRACAUDAL; PERINEURAL AS NEEDED
Status: DISCONTINUED | OUTPATIENT
Start: 2018-10-08 | End: 2018-10-08 | Stop reason: HOSPADM

## 2018-10-08 RX ORDER — ACETAMINOPHEN 325 MG/1
650 TABLET ORAL ONCE AS NEEDED
Status: DISCONTINUED | OUTPATIENT
Start: 2018-10-08 | End: 2018-10-08 | Stop reason: HOSPADM

## 2018-10-08 RX ORDER — PROMETHAZINE HYDROCHLORIDE 25 MG/ML
12.5 INJECTION, SOLUTION INTRAMUSCULAR; INTRAVENOUS ONCE AS NEEDED
Status: DISCONTINUED | OUTPATIENT
Start: 2018-10-08 | End: 2018-10-08 | Stop reason: HOSPADM

## 2018-10-08 RX ORDER — SODIUM CHLORIDE, SODIUM GLUCONATE, SODIUM ACETATE, POTASSIUM CHLORIDE, AND MAGNESIUM CHLORIDE 526; 502; 368; 37; 30 MG/100ML; MG/100ML; MG/100ML; MG/100ML; MG/100ML
1000 INJECTION, SOLUTION INTRAVENOUS CONTINUOUS
Status: DISCONTINUED | OUTPATIENT
Start: 2018-10-08 | End: 2018-10-08 | Stop reason: HOSPADM

## 2018-10-08 RX ORDER — BACITRACIN 50000 [IU]/1
INJECTION, POWDER, FOR SOLUTION INTRAMUSCULAR AS NEEDED
Status: DISCONTINUED | OUTPATIENT
Start: 2018-10-08 | End: 2018-10-08 | Stop reason: HOSPADM

## 2018-10-08 RX ADMIN — LIDOCAINE HYDROCHLORIDE 80 MG: 20 INJECTION, SOLUTION INFILTRATION; PERINEURAL at 13:02

## 2018-10-08 RX ADMIN — ROCURONIUM BROMIDE 30 MG: 10 INJECTION INTRAVENOUS at 13:09

## 2018-10-08 RX ADMIN — DEXAMETHASONE SODIUM PHOSPHATE 4 MG: 4 INJECTION, SOLUTION INTRAMUSCULAR; INTRAVENOUS at 14:12

## 2018-10-08 RX ADMIN — SODIUM CHLORIDE, SODIUM GLUCONATE, SODIUM ACETATE, POTASSIUM CHLORIDE, AND MAGNESIUM CHLORIDE: 526; 502; 368; 37; 30 INJECTION, SOLUTION INTRAVENOUS at 13:55

## 2018-10-08 RX ADMIN — SODIUM CHLORIDE, SODIUM GLUCONATE, SODIUM ACETATE, POTASSIUM CHLORIDE, AND MAGNESIUM CHLORIDE 1000 ML: 526; 502; 368; 37; 30 INJECTION, SOLUTION INTRAVENOUS at 10:59

## 2018-10-08 RX ADMIN — FENTANYL CITRATE 50 MCG: 50 INJECTION, SOLUTION INTRAMUSCULAR; INTRAVENOUS at 14:09

## 2018-10-08 RX ADMIN — Medication 2 G: at 13:10

## 2018-10-08 RX ADMIN — ALBUTEROL SULFATE 2.5 MG: 2.5 SOLUTION RESPIRATORY (INHALATION) at 12:11

## 2018-10-08 RX ADMIN — ONDANSETRON 4 MG: 2 INJECTION INTRAMUSCULAR; INTRAVENOUS at 14:12

## 2018-10-08 RX ADMIN — MIDAZOLAM HYDROCHLORIDE 2 MG: 2 INJECTION, SOLUTION INTRAMUSCULAR; INTRAVENOUS at 12:43

## 2018-10-08 RX ADMIN — MEPERIDINE HYDROCHLORIDE 12.5 MG: 50 INJECTION INTRAMUSCULAR; INTRAVENOUS; SUBCUTANEOUS at 15:22

## 2018-10-08 RX ADMIN — HYDROMORPHONE HYDROCHLORIDE 0.5 MG: 1 INJECTION, SOLUTION INTRAMUSCULAR; INTRAVENOUS; SUBCUTANEOUS at 15:12

## 2018-10-08 RX ADMIN — HYDROMORPHONE HYDROCHLORIDE 0.5 MG: 1 INJECTION, SOLUTION INTRAMUSCULAR; INTRAVENOUS; SUBCUTANEOUS at 15:19

## 2018-10-08 RX ADMIN — SUCCINYLCHOLINE CHLORIDE 180 MG: 20 INJECTION, SOLUTION INTRAMUSCULAR; INTRAVENOUS at 13:02

## 2018-10-08 RX ADMIN — PHENYLEPHRINE HYDROCHLORIDE 100 MCG: 10 INJECTION INTRAVENOUS at 13:10

## 2018-10-08 RX ADMIN — MEPERIDINE HYDROCHLORIDE 12.5 MG: 50 INJECTION INTRAMUSCULAR; INTRAVENOUS; SUBCUTANEOUS at 15:27

## 2018-10-08 RX ADMIN — MEPERIDINE HYDROCHLORIDE 12.5 MG: 50 INJECTION INTRAMUSCULAR; INTRAVENOUS; SUBCUTANEOUS at 15:14

## 2018-10-08 RX ADMIN — HYDROMORPHONE HYDROCHLORIDE 0.5 MG: 1 INJECTION, SOLUTION INTRAMUSCULAR; INTRAVENOUS; SUBCUTANEOUS at 14:54

## 2018-10-08 RX ADMIN — FENTANYL CITRATE 100 MCG: 50 INJECTION, SOLUTION INTRAMUSCULAR; INTRAVENOUS at 13:02

## 2018-10-08 RX ADMIN — HYDROMORPHONE HYDROCHLORIDE 0.5 MG: 1 INJECTION, SOLUTION INTRAMUSCULAR; INTRAVENOUS; SUBCUTANEOUS at 15:02

## 2018-10-08 RX ADMIN — PHENYLEPHRINE HYDROCHLORIDE 100 MCG: 10 INJECTION INTRAVENOUS at 13:16

## 2018-10-08 RX ADMIN — PROPOFOL 200 MG: 10 INJECTION, EMULSION INTRAVENOUS at 13:02

## 2018-10-08 RX ADMIN — MEPERIDINE HYDROCHLORIDE 12.5 MG: 50 INJECTION INTRAMUSCULAR; INTRAVENOUS; SUBCUTANEOUS at 15:08

## 2018-10-08 NOTE — ANESTHESIA PREPROCEDURE EVALUATION
Anesthesia Evaluation     Patient summary reviewed   no history of anesthetic complications:  NPO Solid Status: > 8 hours  NPO Liquid Status: > 4 hours           Airway   Mallampati: II  TM distance: >3 FB  Neck ROM: full  no difficulty expected  Comment: Full beard.  Dental    (+) upper dentures and lower dentures    Pulmonary - normal exam   (+) a smoker Current Abstained day of surgery, COPD moderate, decreased breath sounds, wheezes,     ROS comment:    FINDINGS: Frontal and lateral views of the chest are obtained.      Devices: None     Lungs/Pleura: The lungs appear hyperinflated and there are coarse  interstitial markings likely due to COPD. Calcified nodule in the  right lung base.  The lungs are otherwise clear.     Cardiomediastinal structures: Normal         IMPRESSION:  CONCLUSION:    No acute cardiopulmonary disease     Electronically signed by:  Drew Darnell MD  3/31/2017 2:54 PM CDT  PE comment: Albuterol treatment ordered pre-op.  Cardiovascular - normal exam    ECG reviewed  PT is on anticoagulation therapy  Rhythm: regular  Rate: normal    (+) hypertension, hyperlipidemia,  carotid artery disease (Carotid artery angioplasty.)  (-) murmur    ROS comment: Normal sinus rhythm  Normal ECG  When compared with ECG of 08-JUL-2013 19:04,  No significant change was found    Referred By:             Confirmed By:     Specimen Collected: 10/03/18 15:10          Neuro/Psych  (+) CVA,     GI/Hepatic/Renal/Endo    (+)  GERD well controlled,      Musculoskeletal     Abdominal  - normal exam   Substance History - negative use     OB/GYN negative ob/gyn ROS         Other   (+) arthritis                       Anesthesia Plan    ASA 3     general   (Wants no regional anesthesia.)  intravenous induction   Anesthetic plan, all risks, benefits, and alternatives have been provided, discussed and informed consent has been obtained with: patient and spouse/significant other.

## 2018-10-08 NOTE — ANESTHESIA PROCEDURE NOTES
Airway  Urgency: elective    Airway not difficult    General Information and Staff    Patient location during procedure: OR  CRNA: ERNST REYES    Indications and Patient Condition  Indications for airway management: airway protection    Preoxygenated: yes  Mask difficulty assessment: 2 - vent by mask + OA or adjuvant +/- NMBA    Final Airway Details  Final airway type: endotracheal airway      Successful airway: ETT  Cuffed: yes   Successful intubation technique: direct laryngoscopy  Facilitating devices/methods: cricoid pressure  Endotracheal tube insertion site: oral  Blade: Kimball  Blade size: 4  ETT size: 7.5 mm  Cormack-Lehane Classification: grade I - full view of glottis  Placement verified by: chest auscultation and capnometry   Measured from: lips  ETT to lips (cm): 20  Number of attempts at approach: 1

## 2018-10-08 NOTE — OP NOTE
SHOULDER PROCEDURE OPEN  Procedure Note    Name:    Heber Houston  YOB: 1955  Date of surgery:   10/8/2018    Pre-op Diagnosis:   Essential hypertension [I10]  Neuropathic pain of both legs [G57.91, G57.92]  AC separation, type 3, right, subsequent encounter [S43.101D]  Acute pain of right shoulder [M25.511]  History of stroke in adulthood [Z86.73]    Post-op Diagnosis:    Post-Op Diagnosis Codes:     * Essential hypertension [I10]     * Neuropathic pain of both legs [G57.93]     * AC separation, type 3, right, subsequent encounter [S43.101D]     * Acute pain of right shoulder [M25.511]     * History of stroke in adulthood [Z86.73]    Procedure:  Procedure(s):  acromioclavicular ligament reconstruction with dogbone implant - right - with coracoacromial ligament transfer    Surgeon:  Surgeon(s):  Chance Lee MD    ASSISTANT:  Janell Negrete CSA    Anesthesia: Choice    Staff:   Circulator: Rich Ramos RN; Kaelyn Almonte RN  Scrub Person: Marly Carrero; Anjana Fournier  Assistant: Janell Negrete CSA    Estimated Blood Loss: minimal    Specimens:                None      Drains:  none    Findings:  As above    Complications: None    IMPLANTS:     Implant Name Type Inv. Item Serial No.  Lot No. LRB No. Used   ARTHREX KNOTLESS AC REPAIR SYSTEM          [id number] Right 1         PROCEDURE:    Consent was obtained the patient was taken to the operating room and once adequate anesthesia was obtained the right upper extremity was prepped and draped in the standard surgical fashion.  He was placed in a modified beachchair position and a longitudinal sheet roll was placed between his shoulder blades.  His head was adequately secured.  A surgical timeout was performed.  The incision line was injected with quarter percent Marcaine with epinephrine.  The incision was then made over the acromioclavicular joint and down to the coracoid process.  Dissection was carried down  to the acromioclavicular joint capsule.  This was then incised exposing the end of the clavicle.  The clavicle was then fully identified and the distal 1 cm of bone was then resected with the saw.  The anterior aspect of the acromion was then gently dissected and the coracoacromial ligament was identified and released from the anterior aspect of the acromion.  A #2 FiberWire was then passed through the coracoacromial ligament with both strands exiting the distal aspect for ligament transfer.  The inferior aspect of the incision was then utilized to expose the undersurface of the coracoid process.  Once the conjoined tendon was split and the undersurface of the coracoid identified then the guide could be placed on the inferior surface of the coracoid.  The drill was then utilized to drill through the clavicle with 2 cortices being felt.  The drill was then advanced through the base of the coracoid and again 2 courses were felt as the drill advanced.  Just as it exited the far cortex then the drill was stopped and then the guide was overreamed to drill the inset into the superior surface of the clavicle.  The inner trocar of the drill bit was then removed and the Nitenol wire was passed through the drill bit and then grasped on the undersurface of the coracoid.  Once this was completed then the drill was removed and then the nitinol wire was used to pass the FiberWire sutures through the clavicle and the coracoid.  Once this was complete then the dog bone implant was situated on the sutures.  The pull suture was then cut and then the implant was seated on the undersurface of the coracoid.  This was palpated digitally.  The clavicle was then reduced into position and these sutures were tightened.  Once that was complete then the clavicle was felt to be in the appropriate position and the excess sutures were then cut.  The sutures for the ligament transfer were then utilized to pass through the end of the clavicle.  By  doing so once these were pulled taut and then the coracoacromial ligament was then transferred into the distal clavicle.  This allowed for a fascial graft in addition to the Arthrex implant.  The wound was then copiously irrigated with bacitracin saline.  The wound was then closed with 0 Vicryl to close the acromioclavicular joint capsule and the deep subcutaneous tissue.  2-0 Vicryl to close the subcutaneous tissue and then skin staples.  The wound was then covered with Xeroform gauze 4 x 4's and Elastoplast dressing.  The patient was then awakened and taken to the recovery room in good condition.  He tolerated the procedure very well.    Chance Lee MD     Date: 10/8/2018  Time: 2:57 PM

## 2018-10-08 NOTE — ANESTHESIA POSTPROCEDURE EVALUATION
Patient: Heber Houston    Procedure Summary     Date:  10/08/18 Room / Location:  [facility] OR [address] [place] [address] OR    Anesthesia Start:  1245 Anesthesia Stop:  1451    Procedure:  acromioclavicular ligament reconstruction with dogbone implant - right - with coracoacromial ligament transfer (Right Shoulder) Diagnosis:       Essential hypertension      Neuropathic pain of both legs      AC separation, type 3, right, subsequent encounter      Acute pain of right shoulder      History of stroke in adulthood      (Essential hypertension [I10])      (Neuropathic pain of both legs [G57.91, G57.92])      (AC separation, type 3, right, subsequent encounter [S43.101D])      (Acute pain of right shoulder [M25.511])      (History of stroke in adulthood [Z86.73])    Surgeon:  Chance Lee MD Provider:  Kulwinder Dupree MD    Anesthesia Type:  general ASA Status:  3          Anesthesia Type: general  Last vitals  BP   138/82 (10/08/18 1037)   Temp   97.8 °F (36.6 °C) (10/08/18 1037)   Pulse   78 (10/08/18 1037)   Resp   18 (10/08/18 1037)     SpO2   95 % (10/08/18 1037)     Post Anesthesia Care and Evaluation    Patient location during evaluation: bedside  Patient participation: complete - patient participated  Level of consciousness: confused and combative  Pain score: 0  Pain management: adequate  Airway patency: patent  Anesthetic complications: No anesthetic complications  PONV Status: none  Cardiovascular status: acceptable  Respiratory status: acceptable  Hydration status: acceptable

## 2018-10-08 NOTE — H&P (VIEW-ONLY)
Heber Houston is a 62 y.o. male returns for     Chief Complaint   Patient presents with   • Right Clavicle - Follow-up       HISTORY OF PRESENT ILLNESS:  F/u on rt clavicle, patient had xrays today, patient brought MRI disic with him today had MRI 1 week ago,  Is having constant pain.  He has occasional sharp pains with certain activities.  His pain is not improving.  He denies numbness or tingling.  He does have pain at night that keeps him awake.     CONCURRENT MEDICAL HISTORY:    Past Medical History:   Diagnosis Date   • Chronic pain disorder    • COPD (chronic obstructive pulmonary disease) (CMS/HCC)    • Hypertension    • Leg pain, bilateral    • Stroke (CMS/HCC) 2012    tia     Family History   Problem Relation Age of Onset   • Coronary artery disease Mother    • Hypertension Mother    • Coronary artery disease Father    • Hypertension Father      Past Surgical History:   Procedure Laterality Date   • CAROTID ARTERY ANGIOPLASTY Left 2012   • CHOLECYSTECTOMY     • ENDOSCOPY N/A 6/19/2017    Procedure: ESOPHAGOGASTRODUODENOSCOPY;  Surgeon: Sharan Gupta DO;  Location: [facility] ENDOSCOPY;  Service:    • JOINT REPLACEMENT Left 03/26/2018    total knee     Current Outpatient Prescriptions on File Prior to Visit   Medication Sig Dispense Refill   • amLODIPine (NORVASC) 5 MG tablet Take 5 mg by mouth Daily.     • atorvastatin (LIPITOR) 10 MG tablet Take 10 mg by mouth Daily.     • clopidogrel (PLAVIX) 75 MG tablet Take 75 mg by mouth Daily.     • EPINEPHrine (EPIPEN 2-ELIDIA) 0.3 MG/0.3ML solution auto-injector injection Inject 0.3 mL under the skin As Needed (anaphylaxis). 1 each 1   • raNITIdine (ZANTAC) 150 MG tablet Take 150 mg by mouth 2 (Two) Times a Day.       Allergies   Allergen Reactions   • Lisinopril Swelling   • Lisinopril-Hydrochlorothiazide Swelling     Social History   Substance Use Topics   • Smoking status: Current Every Day Smoker     Packs/day: 1.00     Years: 44.00     Types: Cigarettes   •  "Smokeless tobacco: Never Used      Comment: recently stopped   • Alcohol use 3.0 oz/week     5 Cans of beer per week      Comment: 6 PK/WK         ROS  No fevers or chills.  No chest pain or shortness of air.  No GI or  disturbances.  All other systems reported as negative except right shoulder pain.    PHYSICAL EXAMINATION:       Ht 193 cm (76\")   Wt 100 kg (221 lb)   BMI 26.90 kg/m²     Physical Exam   Constitutional: He is oriented to person, place, and time. He appears well-developed and well-nourished. No distress.   Cardiovascular: Normal rate, regular rhythm and normal heart sounds.    Pulmonary/Chest: Effort normal and breath sounds normal.   Abdominal: Soft. Bowel sounds are normal.   Neurological: He is alert and oriented to person, place, and time.   Psychiatric: He has a normal mood and affect. His behavior is normal. Judgment and thought content normal.   Vitals reviewed.      GAIT:     [x]  Normal  []  Antalgic    Assistive device: [x]  None  []  Walker     []  Crutches  []  Cane     []  Wheelchair  []  Stretcher    Right Shoulder Exam     Tenderness   Right shoulder tenderness location: Very tender over the acromioclavicular joint.    Range of Motion   Active Abduction: 70   Forward Flexion: 80     Muscle Strength   Abduction: 4/5   Subscapularis: 4/5     Tests   Hawkin's test: positive  Impingement: positive    Other   Erythema: absent  Sensation: normal  Pulse: present      Left Shoulder Exam     Tenderness   The patient is experiencing no tenderness.         Range of Motion   The patient has normal left shoulder ROM.    Muscle Strength   The patient has normal left shoulder strength.    Tests   Apprehension: negative  Hawkin's test: negative  Impingement: negative    Other   Erythema: absent  Sensation: normal  Pulse: present               Xr Acromioclavicular Joints Bilateral With & Without Weights    Result Date: 9/19/2018  Narrative: Ordering Provider:  Chance Lee MD Ordering " Diagnosis/Indication:  Separation of AC joint, right, initial encounter Procedure:  XR ACROMIOCLAVICULAR JOINTS BILATERAL W WO WEIGHTS Exam Date:  9/18/18 COMPARISON:  Not applicable, no relevant images available.     Impression:  AP of bilateral clavicles and acromioclavicular joint's with and without weights show that he has severe osteoarthritic changes noted in the acromioclavicular joint on the left and shows a grade 3 acromioclavicular joint separation on the right.  No significant change with the weights. No acute bony abnormality is noted. Chance Lee MD 9/18/18             ASSESSMENT:    Diagnoses and all orders for this visit:    AC separation, type 3, right, subsequent encounter  -     Case Request; Standing  -     ceFAZolin (ANCEF) 2 g in sodium chloride 0.9 % 100 mL IVPB; Infuse 2 g into a venous catheter 1 (One) Time.  -     Case Request    Acute pain of right shoulder  -     Case Request; Standing  -     ceFAZolin (ANCEF) 2 g in sodium chloride 0.9 % 100 mL IVPB; Infuse 2 g into a venous catheter 1 (One) Time.  -     Case Request    Neuropathic pain of both legs  -     Case Request; Standing  -     ceFAZolin (ANCEF) 2 g in sodium chloride 0.9 % 100 mL IVPB; Infuse 2 g into a venous catheter 1 (One) Time.  -     Case Request    Essential hypertension  -     Case Request; Standing  -     ceFAZolin (ANCEF) 2 g in sodium chloride 0.9 % 100 mL IVPB; Infuse 2 g into a venous catheter 1 (One) Time.  -     Case Request    History of stroke in adulthood  -     Case Request; Standing  -     ceFAZolin (ANCEF) 2 g in sodium chloride 0.9 % 100 mL IVPB; Infuse 2 g into a venous catheter 1 (One) Time.  -     Case Request    Other orders  -     Follow Anesthesia Guidelines / Standing Orders; Future  -     Follow Anesthesia Guidelines / Standing Orders; Standing  -     Verify NPO Status; Standing  -     Obtain informed consent (if not collected inpatient or PAT); Standing          PLAN    The patient voiced  understanding of the risks, benefits, and alternative forms of treatment that were discussed and the patient consents to proceed with surgery.  All risks, benefits and alternatives were discussed.  Risks including to but not exclusive to anesthetic complications, including death, MI, CVA, infection, bleeding DVT, fracture, residual pain and need for future surgery.  This discussion was held with the patient by Chance Lee MD and all questions were answered.    Plan AC joint reconstruction with dogbone implant.    Stop plavix 5 days prior to surgery.        Patient's Body mass index is 26.9 kg/m². BMI is above normal parameters. Recommendations include: exercise counseling and nutrition counseling.    Return for Post-operative eval.    Chance Lee MD

## 2018-10-08 NOTE — INTERVAL H&P NOTE
H&P reviewed. The patient was examined and there are no changes to the H&P.     10/08/18 at 10:52 AM by Chance Lee MD

## 2018-10-08 NOTE — DISCHARGE INSTRUCTIONS
Minor Surgery  Outpatient Instructions    General Information  You have had a minor surgical procedure and are not expected to require extensive treatment or a long recovery period.  However, the following information/instructions that are listed serve as guidelines to help you recover at home.    Activity: Wear sling at all times    Hygiene: Shower in 2 day after dressing is removed, sponge bath    Dressing/Wound Care: Remove dressing in 2 days    Diet: as tolerated.    Important Points:  -Call your doctor to report any of the following:   -unusual or excessive bleeding/drainage   -pain not reduced/controlled by medication   -elevated temperature consistently greater that 100 degrees F   -increased swelling, redness, bruising, or tenderness in surgical area  -Take medications as prescribed by your physician  -If you have any questions, please contact your doctor at [phone] or [phone]  -If a post-operative emergency occurs, report to your nearest ER

## 2018-10-18 ENCOUNTER — OFFICE VISIT (OUTPATIENT)
Dept: ORTHOPEDIC SURGERY | Facility: CLINIC | Age: 63
End: 2018-10-18

## 2018-10-18 VITALS — BODY MASS INDEX: 27.52 KG/M2 | HEIGHT: 76 IN | WEIGHT: 226 LBS

## 2018-10-18 DIAGNOSIS — M25.511 ACUTE PAIN OF RIGHT SHOULDER: Primary | ICD-10-CM

## 2018-10-18 DIAGNOSIS — Z98.890 STATUS POST RECONSTRUCTION OF ACROMIOCLAVICULAR JOINT: ICD-10-CM

## 2018-10-18 DIAGNOSIS — S43.101D AC SEPARATION, TYPE 3, RIGHT, SUBSEQUENT ENCOUNTER: ICD-10-CM

## 2018-10-18 PROCEDURE — 99024 POSTOP FOLLOW-UP VISIT: CPT | Performed by: NURSE PRACTITIONER

## 2018-10-18 NOTE — PROGRESS NOTES
"Heber Houston is a 62 y.o. male returns for     Chief Complaint   Patient presents with   • Right Shoulder - Post-op     Xray today       HISTORY OF PRESENT ILLNESS: Patient presents to office for postoperative follow up status post right AC joint reconstruction with coracoacromial ligament transfer performed per Dr. Lee on 10/8/2018. Patient is doing well postoperatively with no unusual complaints or concerns noted. He reports his pain and range of motion are significantly improved. He continues with physical therapy at [facility] and is pleased with his progress. He has transitioned out of his sling now. Staples are removed today. X-rays repeated today.      CONCURRENT MEDICAL HISTORY:    The following portions of the patient's history were reviewed and updated as appropriate: allergies, current medications, past family history, past medical history, past social history, past surgical history and problem list.     ROS  No fevers or chills.  No chest pain or shortness of air.  No GI or  disturbances.     PHYSICAL EXAMINATION:       Ht 193 cm (76\")   Wt 103 kg (226 lb)   BMI 27.51 kg/m²     Physical Exam   Constitutional: He is oriented to person, place, and time. Vital signs are normal. He appears well-developed and well-nourished. He is active and cooperative. He does not appear ill. No distress.   HENT:   Head: Normocephalic.   Pulmonary/Chest: Effort normal. No respiratory distress.   Abdominal: Soft. He exhibits no distension.   Musculoskeletal: He exhibits edema (Right shoulder/AC joint), tenderness (Right shoulder/AC joint) and deformity (Right shoulder/AC joint).   Neurological: He is alert and oriented to person, place, and time. GCS eye subscore is 4. GCS verbal subscore is 5. GCS motor subscore is 6.   Skin: Skin is warm, dry and intact. Capillary refill takes less than 2 seconds. No erythema.   Psychiatric: He has a normal mood and affect. His speech is normal and behavior is " normal. Judgment and thought content normal. Cognition and memory are normal.   Vitals reviewed.      GAIT:     [x]  Normal  []  Antalgic    Assistive device: [x]  None  []  Walker     []  Crutches  []  Cane     []  Wheelchair  []  Stretcher    Right Shoulder Exam     Tenderness   The patient is experiencing tenderness in the acromioclavicular joint.    Range of Motion   Active Abduction: 100   Passive Abduction: 120   Forward Flexion: 140     Muscle Strength   Right shoulder normal muscle strength: deferred.    Other   Erythema: absent  Sensation: normal  Pulse: present    Comments:  Surgical incision is well-approximated with no erythema and no drainage noted. No signs of infection noted. Localized swelling and mild deformity present to AC joint. Mild pain and limitations with range of motion. Overall, good motion in the shoulder. Stable joint exam. Strength assessment deferred.             Xr Shoulder 1 View Right    Result Date: 10/8/2018  Narrative: Patient Name:  KRISTOPHER SMITH Patient ID:  [id number] Ordering:  ADDIS RODRIGUEZ Attending:  ADDIS RODRIGUEZ Referring:  ADDIS RODRIGUEZ ------------------------------------------------ EXAMINATION:  Right shoulder INDICATION: Postoperative. COMPARISON : None available VIEWS:   1 Image FINDINGS:  Evidence of surgery noted over the right shoulder joint with surgical clips noted overlying the distal clavicle. There is evidence of resection of the distal end of the clavicle. The glenohumeral joint is unremarkable. No other significant bony abnormality identified. Old healed fracture of the right fifth rib also noted.         Impression: CONCLUSION:  1.  Evidence of surgery noted involving the distal clavicle at the acromioclavicular joint. Recommended correlation with history. Electronically signed by:  Jose Mack  10/8/2018 3:19 PM CDT Workstation: [employer]    Xr Shoulder 2+ View Right    Result Date: 10/20/2018  Narrative: 3 views of  the right shoulder reveal postsurgical changes with evidence of resection of the distal clavicle.  There is a surgical fixation anchor noted to the superior aspect of the clavicle.  There is an additional anchor versus surgical clip noted inferior to this that does not appear to be attached/seated into a bone.  The hardware positioning and appearance is unchanged when compared with prior images from 10/8/2018.  There is evidence of a grade 3 acromioclavicular joint separation that appears unchanged when compared with prior images from 10/8/2018.  No evidence of acute fracture.  Glenohumeral joint spacing is maintained.  The visualized lung field is clear.  No acute bony radiologic abnormalities are noted at this time. 10/20/18 at 11:47 AM by DANAE Kitchen         ASSESSMENT:    Diagnoses and all orders for this visit:    Acute pain of right shoulder  -     XR Shoulder 2+ View Right    AC separation, type 3, right, subsequent encounter  -     XR Shoulder 2+ View Right    Status post reconstruction of acromioclavicular joint    PLAN    X-rays of right shoulder reviewed today. X-rays also reviewed per Dr. Lee. Patient is doing well postoperatively with improved pain and range of motion. The patient is pleased with his progress. He has transitioned out his sling now and continues with work with PT. Recommend to continue with physical therapy course. Recommend to continue with activity restrictions as previously instructed with no strengthening or motion against resistance until 6 weeks postoperatively. Progress gentle range of motion as tolerated. Use sling as needed for increased periods of pain/rest of the right arm. Continue with ice therapy as needed to minimize pain/swelling. Recommend Tylenol as needed for mild pain and Norco as needed for moderate pain. Patient is unable to take oral NSAIDs due to his current use of Plavix. Surgical incision is healing as expected with no signs of infection. Staples are  removed today and steri-strips placed. Recommend to continue to monitor the incision for any signs of infection including increased redness, increased swelling, increased warmth, increased tenderness and/or purulent drainage. Patient is instructed to notify the office immediately if any signs of infection are noted. Follow up in 4 weeks for recheck and repeat x-rays at that time. Follow up sooner as needed for any new or worsening symptoms or any concerns.     Return in about 4 weeks (around 11/15/2018) for Recheck.      This document has been electronically signed by DANAE Kitchen on October 21, 2018 5:50 PM      DANAE Kitchen

## 2018-10-21 PROBLEM — Z98.890: Status: ACTIVE | Noted: 2018-10-21

## 2018-11-08 ENCOUNTER — TRANSCRIBE ORDERS (OUTPATIENT)
Dept: PULMONOLOGY | Facility: HOSPITAL | Age: 63
End: 2018-11-08

## 2018-11-08 DIAGNOSIS — J44.9 CHRONIC OBSTRUCTIVE PULMONARY DISEASE, UNSPECIFIED COPD TYPE (HCC): Primary | ICD-10-CM

## 2018-11-13 ENCOUNTER — HOSPITAL ENCOUNTER (OUTPATIENT)
Dept: PULMONOLOGY | Facility: HOSPITAL | Age: 63
Setting detail: THERAPIES SERIES
Discharge: HOME | End: 2018-11-13

## 2018-11-13 DIAGNOSIS — J44.9 CHRONIC OBSTRUCTIVE PULMONARY DISEASE, UNSPECIFIED COPD TYPE (HCC): Primary | ICD-10-CM

## 2018-11-13 NOTE — PROGRESS NOTES
Pt arrived for Pulmonary rehab today.  He stated that he is doing PT at [facility].  I informed the patient that he cannot do rehab here and PT at the same [facility].  Insurance will not pay. He stated that he is going back to work in [place] and will be going back the the [facility] to workout when released from PT.  Pt opted to decline Pulm. Rehab.  Education material on managing COPD given to patient.

## 2018-11-14 DIAGNOSIS — S43.101D AC SEPARATION, TYPE 3, RIGHT, SUBSEQUENT ENCOUNTER: Primary | ICD-10-CM

## 2018-11-15 ENCOUNTER — OFFICE VISIT (OUTPATIENT)
Dept: ORTHOPEDIC SURGERY | Facility: CLINIC | Age: 63
End: 2018-11-15

## 2018-11-15 VITALS — HEIGHT: 76 IN | WEIGHT: 229 LBS | BODY MASS INDEX: 27.89 KG/M2

## 2018-11-15 DIAGNOSIS — S43.101D AC SEPARATION, TYPE 3, RIGHT, SUBSEQUENT ENCOUNTER: Primary | ICD-10-CM

## 2018-11-15 DIAGNOSIS — M25.511 ACUTE PAIN OF RIGHT SHOULDER: ICD-10-CM

## 2018-11-15 DIAGNOSIS — Z98.890 STATUS POST RECONSTRUCTION OF ACROMIOCLAVICULAR JOINT: ICD-10-CM

## 2018-11-15 PROCEDURE — 99024 POSTOP FOLLOW-UP VISIT: CPT | Performed by: NURSE PRACTITIONER

## 2018-11-15 NOTE — PROGRESS NOTES
Heber Houston is a 62 y.o. male is s/p       Chief Complaint   Patient presents with   • Right Shoulder - Post-op       HISTORY OF PRESENT ILLNESS:  Patient presents to office for postoperative follow-up status post right AC joint reconstruction with coracoacromial ligament transfer performed per Dr. Lee on 10/8/2018.  Patient continues to do very well postoperatively and continues to progressively improved.  No unusual complaints or concerns noted.  Patient reports minimal pain and significantly improved range of motion.  Patient primarily complains of some weakness and fatigue in the right shoulder with exertional use.  He continues with physical therapy at [facility] and is pleased with his progress.  He is no longer using the sling to his right arm.  Surgical incision is well-healed.  X-rays are repeated today.  Patient states he hopes to return to work in the next 4 weeks.    Allergies   Allergen Reactions   • Lisinopril-Hydrochlorothiazide Swelling         Current Outpatient Medications:   •  albuterol (PROVENTIL HFA;VENTOLIN HFA) 108 (90 Base) MCG/ACT inhaler, Inhale 2 puffs Every 4 (Four) Hours As Needed for Wheezing., Disp: , Rfl:   •  amLODIPine (NORVASC) 5 MG tablet, Take 5 mg by mouth Every Night., Disp: , Rfl:   •  atorvastatin (LIPITOR) 10 MG tablet, Take 10 mg by mouth Every Night., Disp: , Rfl:   •  clopidogrel (PLAVIX) 75 MG tablet, Take 75 mg by mouth Every Night., Disp: , Rfl:   •  EPINEPHrine (EPIPEN 2-ELIDIA) 0.3 MG/0.3ML solution auto-injector injection, Inject 0.3 mL under the skin As Needed (anaphylaxis)., Disp: 1 each, Rfl: 1  •  HYDROcodone-acetaminophen (NORCO) 7.5-325 MG per tablet, Take 1 tablet by mouth Every 4 (Four) Hours As Needed for Moderate Pain., Disp: 40 tablet, Rfl: 0  •  raNITIdine (ZANTAC) 150 MG tablet, Take 150 mg by mouth 2 (Two) Times a Day., Disp: , Rfl:   •  tiotropium bromide-olodaterol (STIOLTO RESPIMAT) 2.5-2.5 MCG/ACT aerosol solution inhaler,  Inhale 2 puffs Daily., Disp: , Rfl:     No fevers or chills.  No nausea or vomiting.      PHYSICAL EXAMINATION:       Heber Houston is a 62 y.o. male    Patient is awake and alert, answers questions appropriately and is in no apparent distress.    GAIT:     [x]  Normal  []  Antalgic    Assistive device: [x]  None  []  Walker     []  Crutches  []  Cane     []  Wheelchair  []  Stretcher    Right Shoulder Exam     Tenderness   The patient is experiencing tenderness in the acromioclavicular joint, acromion and clavicle.    Range of Motion   Active abduction: 150   Passive abduction: 170   Extension: 50   Forward flexion: 170     Muscle Strength   Abduction: 4/5   Supraspinatus: 4/5     Tests   Soria test: negative  Cross arm: negative  Impingement: negative  Drop arm: negative    Other   Erythema: absent  Sensation: normal  Pulse: present    Comments:  Surgical incision is well-healed and well approximated with no erythema and no drainage.  No signs of infection noted.  Mild deformity present to the AC joint, unchanged from prior exam.  Excellent range of motion of the shoulder with minimal discomfort.  Stable joint exam.            Xr Shoulder 2+ View Right    Result Date: 11/15/2018  Narrative: 3 views of the right shoulder reveal postsurgical changes with evidence of resection of the distal clavicle.  There is a surgical fixation anchor noted to the superior aspect of the clavicle.  There is an additional anchor versus surgical clip noted inferior to this that does not appear to be attached/seated into the bone.  The hardware positioning and appearance is unchanged when compared with prior images from 10/20/2018.  There is evidence of a grade 3 acromioclavicular joint separation that appears unchanged when compared with prior images from 10/20/2018.  Glenohumeral joint spacing is maintained.  The visualized lung field is clear.  No acute bony radiologic abnormalities are noted at this time.11/15/18 at 11:21  AM by DANAE Kitchen     Xr Shoulder 2+ View Right    Result Date: 10/20/2018  Narrative: 3 views of the right shoulder reveal postsurgical changes with evidence of resection of the distal clavicle.  There is a surgical fixation anchor noted to the superior aspect of the clavicle.  There is an additional anchor versus surgical clip noted inferior to this that does not appear to be attached/seated into a bone.  The hardware positioning and appearance is unchanged when compared with prior images from 10/8/2018.  There is evidence of a grade 3 acromioclavicular joint separation that appears unchanged when compared with prior images from 10/8/2018.  No evidence of acute fracture.  Glenohumeral joint spacing is maintained.  The visualized lung field is clear.  No acute bony radiologic abnormalities are noted at this time. 10/20/18 at 11:47 AM by DANAE Kitchen         ASSESSMENT:    Diagnoses and all orders for this visit:    AC separation, type 3, right, subsequent encounter    Acute pain of right shoulder    Status post reconstruction of acromioclavicular joint    PLAN    X-rays of right shoulder reviewed and compared with prior images with no significant changes noted.  Patient is doing very well postoperatively and continues to progressively improve.  He has excellent range of motion on exam and complains of minimal pain.  He is no longer wearing his sling to the right arm.  He has been using his right arm for normal daily activities without difficulty.  He primarily only complains of some mild weakness in the right arm and fatigue of the right arm/shoulder with activity.  He states he hopes to return to work in about 4 weeks if possible. Recommend to continue with current course of physical therapy.  Patient will be able to start strengthening exercises now since he is 6 weeks postop.  Progress activity as tolerated.  Continue with ice therapy as needed to minimize pain.  Recommend Tylenol as needed for  pain/discomfort.  Patient is unable to take oral NSAIDs due to his current use of Plavix.  Follow-up in 4 weeks for recheck and repeat x-rays at that time.    Return in about 4 weeks (around 12/13/2018) for Recheck.      This document has been electronically signed by DANAE Kitchen on November 16, 2018 8:12 AM      DANAE Kitchen

## 2018-12-19 DIAGNOSIS — S43.101D AC SEPARATION, TYPE 3, RIGHT, SUBSEQUENT ENCOUNTER: Primary | ICD-10-CM

## 2018-12-20 ENCOUNTER — OFFICE VISIT (OUTPATIENT)
Dept: ORTHOPEDIC SURGERY | Facility: CLINIC | Age: 63
End: 2018-12-20

## 2018-12-20 VITALS — BODY MASS INDEX: 28.42 KG/M2 | WEIGHT: 233.4 LBS | HEIGHT: 76 IN

## 2018-12-20 DIAGNOSIS — M25.511 ACUTE PAIN OF RIGHT SHOULDER: ICD-10-CM

## 2018-12-20 DIAGNOSIS — Z98.890 STATUS POST RECONSTRUCTION OF ACROMIOCLAVICULAR JOINT: ICD-10-CM

## 2018-12-20 DIAGNOSIS — S43.101D AC SEPARATION, TYPE 3, RIGHT, SUBSEQUENT ENCOUNTER: Primary | ICD-10-CM

## 2018-12-20 PROCEDURE — 99024 POSTOP FOLLOW-UP VISIT: CPT | Performed by: NURSE PRACTITIONER

## 2018-12-20 NOTE — PROGRESS NOTES
Heber Houston is a 63 y.o. male is s/p       Chief Complaint   Patient presents with   • Right Shoulder - Post-op     10/8/18  Surgeon Role   Rosa, Chance Flores MD Primary    Procedure Laterality Anesthesia   acromioclavicular ligament reconstruction with dogbone implant - right - with coracoacromial ligament transfer Right Choice          HISTORY OF PRESENT ILLNESS: Patient presents to office for postoperative follow-up status post right AC joint reconstruction with coracoacromial ligament transfer performed per Dr. Lee on 10/8/2018.  Patient continues to do very well postoperatively and continues to progressively improve.  Patient complains of mild, intermittent pain with exertional use of his right shoulder/arm and fatigue at times in the shoulder. No new complaints. Overall, patient is happy with his outcome and progress. Patient has completed his course of physical therapy to the extent that his insurance would pay.  Patient wants to return to work now. X-rays are repeated today.     Allergies   Allergen Reactions   • Lisinopril-Hydrochlorothiazide Swelling         Current Outpatient Medications:   •  albuterol (PROVENTIL HFA;VENTOLIN HFA) 108 (90 Base) MCG/ACT inhaler, Inhale 2 puffs Every 4 (Four) Hours As Needed for Wheezing., Disp: , Rfl:   •  amLODIPine (NORVASC) 5 MG tablet, Take 5 mg by mouth Every Night., Disp: , Rfl:   •  atorvastatin (LIPITOR) 10 MG tablet, Take 10 mg by mouth Every Night., Disp: , Rfl:   •  clopidogrel (PLAVIX) 75 MG tablet, Take 75 mg by mouth Every Night., Disp: , Rfl:   •  EPINEPHrine (EPIPEN 2-ELIDIA) 0.3 MG/0.3ML solution auto-injector injection, Inject 0.3 mL under the skin As Needed (anaphylaxis)., Disp: 1 each, Rfl: 1  •  raNITIdine (ZANTAC) 150 MG tablet, Take 150 mg by mouth 2 (Two) Times a Day., Disp: , Rfl:   •  tiotropium bromide-olodaterol (STIOLTO RESPIMAT) 2.5-2.5 MCG/ACT aerosol solution inhaler, Inhale 2 puffs Daily., Disp: , Rfl:     No fevers or chills.   No nausea or vomiting.      PHYSICAL EXAMINATION:       Heber Houston is a 63 y.o. male    Patient is awake and alert, answers questions appropriately and is in no apparent distress.    GAIT:     [x]  Normal  []  Antalgic    Assistive device: [x]  None  []  Walker     []  Crutches  []  Cane     []  Wheelchair  []  Stretcher    Right Shoulder Exam     Tenderness   The patient is experiencing no tenderness.    Range of Motion   Active abduction: 170   Passive abduction: 170   Extension: 50   Forward flexion: 170     Muscle Strength   Abduction: 4/5   Supraspinatus: 4/5     Tests   Soria test: negative  Cross arm: negative  Impingement: negative  Drop arm: negative    Other   Erythema: absent  Sensation: normal  Pulse: present    Comments:  Surgical incision is well-healed and well approximated with no erythema and no drainage.  No signs of infection noted.  Mild deformity present to the AC joint, unchanged from prior exam.  Excellent range of motion of the shoulder with minimal discomfort.  Stable joint exam.            Xr Shoulder 2+ View Right    Result Date: 12/20/2018  Narrative: 3 views of the right shoulder reveal postsurgical changes with evidence of resection of the distal clavicle.  There is a surgical fixation anchor noted to the superior aspect of the clavicle.  There is an additional anchor versus surgical clip noted inferior to this but does not appear to be attached/seated into the bone.  The hardware positioning and appearance is unchanged when compared with prior images from 11/15/2018 and 10/20/2018.  There is evidence of a grade 3 acromioclavicular joint separation that appears unchanged when compared with prior images.  Glenohumeral joint spacing is maintained.  The visualized lung field is clear.  No acute bony radiologic abnormalities are noted at this time.12/20/18 at 11:44 AM by DANAE Kitchen        ASSESSMENT:    Diagnoses and all orders for this visit:    AC separation, type 3,  right, subsequent encounter    Acute pain of right shoulder    Status post reconstruction of acromioclavicular joint    PLAN    X-rays of right shoulder reviewed and compared with prior images with no significant changes noted.  Patient continues to do very well postoperatively and continues to have progressive improvement.  He has excellent range of motion on exam and good strength.  Patient only complains of some mild, intermittent pain in the right shoulder with exertional use and some fatigue in the shoulder.  Patient has completed his course of physical therapy with improvements in range of motion and strength.  He wants to return to work now.  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  We discussed the possibility of some increased pain with longer periods of working and fatigue in the right arm/shoulder until he is conditioned back to his baseline.  A release back to work without restrictions as provided today. Recommend to continue to progress activity as tolerated.  Continue Tylenol as needed for any pain/discomfort.  Patient is unable to take oral NSAIDs due to his current use of Plavix.  Continue with ice therapy to the right shoulder as needed to minimize pain.  Continue with home exercises for continued strengthening and conditioning.  Follow-up as needed for any new or worsening symptoms or any concerns.    Return if symptoms worsen or fail to improve, for Recheck.      This document has been electronically signed by DANAE Kitchen on December 21, 2018 3:25 PM      DANAE Kitchen

## 2021-01-20 ENCOUNTER — TRANSCRIBE ORDERS (OUTPATIENT)
Dept: PULMONOLOGY | Facility: HOSPITAL | Age: 66
End: 2021-01-20

## 2021-01-20 DIAGNOSIS — R05.3 CHRONIC COUGH: Primary | ICD-10-CM

## 2023-06-05 NOTE — PLAN OF CARE
Problem: Patient Care Overview (Adult)  Goal: Plan of Care Review  Outcome: Outcome(s) achieved Date Met:  06/19/17 06/19/17 3350   Coping/Psychosocial Response Interventions   Plan Of Care Reviewed With patient   Patient Care Overview   Progress no change            Use Enhanced Medication Counseling?: No

## 2023-09-14 NOTE — ANESTHESIA PREPROCEDURE EVALUATION
Anesthesia Evaluation     Patient summary reviewed   NPO Solid Status: > 8 hours  NPO Liquid Status: > 6 hours     Airway   Mallampati: II  TM distance: >3 FB  Neck ROM: full  no difficulty expected  Dental    (+) upper dentures and lower dentures    Pulmonary - normal exam   (+) a smoker Current,   Cardiovascular - normal exam    (+) hypertension,       Neuro/Psych  (+) CVA,    GI/Hepatic/Renal/Endo - negative ROS     Musculoskeletal (-) negative ROS    Abdominal  - normal exam   Substance History - negative use     OB/GYN negative ob/gyn ROS         Other - negative ROS                                       Anesthesia Plan    ASA 3     MAC     intravenous induction   Anesthetic plan and risks discussed with patient.       1

## (undated) DEVICE — GLV SURG TRIUMPH LT PF LTX 8 STRL

## (undated) DEVICE — ANTIBACTERIAL UNDYED BRAIDED (POLYGLACTIN 910), SYNTHETIC ABSORBABLE SUTURE: Brand: COATED VICRYL

## (undated) DEVICE — APPL CHLORAPREP W/TINT 26ML ORNG

## (undated) DEVICE — CONTAINER,SPECIMEN,OR STERILE,4OZ: Brand: MEDLINE

## (undated) DEVICE — GAUZE,SPONGE,4"X4",16PLY,XRAY,STRL,LF: Brand: MEDLINE

## (undated) DEVICE — PRECISION THIN (9.0 X 0.38 X 31.0MM)

## (undated) DEVICE — SPONGE,LAP,SUPER ABSORBENT,18"X18",5/PK: Brand: MEDLINE

## (undated) DEVICE — NDL HYPO PRECISIONGLIDE/REG 18G 1IN PNK

## (undated) DEVICE — SPNG GZ WOVN 4X4IN 12PLY 10/BX STRL

## (undated) DEVICE — GLV SURG SENSICARE PI PF LF 7 GRN STRL

## (undated) DEVICE — SOL IRR NACL 0.9PCT BT 1000ML

## (undated) DEVICE — STCKNT IMPERV 12IN STRL

## (undated) DEVICE — GOWN,PREVENTION PLUS,XLONG/XLARGE,STRL: Brand: MEDLINE

## (undated) DEVICE — GOWN,AURORA,NOREINF,RAGLAN,XL,STERILE: Brand: MEDLINE

## (undated) DEVICE — TRY IRR

## (undated) DEVICE — TP ELAS ELASTIKON ADHS 4IN 2.5YD

## (undated) DEVICE — NDL SUT TONSL DAVIS 1/2 CIR 1849D

## (undated) DEVICE — SUT VICRYL 2-0  X-1 27IN ETVCP459H PLS

## (undated) DEVICE — TP SXN YANKR BLB TIP W/TBG 10F LF STRL

## (undated) DEVICE — Device

## (undated) DEVICE — LIGHT HANDLE: Brand: DEVON

## (undated) DEVICE — 1314 FOAM STRIP NEEDLE COUNTER: Brand: DEVON

## (undated) DEVICE — DRSNG GZ CURAD XEROFORM NONADHS 5X9IN STRL

## (undated) DEVICE — CANN SMPL SOFTECH BIFLO ETCO2 A/M 7FT

## (undated) DEVICE — PENCL E/S HNDSWCH PUSHBTN HOLSTR 10FT

## (undated) DEVICE — SYR LL TP 10ML STRL

## (undated) DEVICE — BNDG ELAS CO-FLEX SLF ADHR 6IN 5YD LF STRL

## (undated) DEVICE — DBD-PACK,SHOULDER III: Brand: MEDLINE

## (undated) DEVICE — STERILE POLYISOPRENE POWDER-FREE SURGICAL GLOVES WITH EMOLLIENT COATING: Brand: PROTEXIS

## (undated) DEVICE — 3M™ IOBAN™ 2 ANTIMICROBIAL INCISE DRAPE 6651EZ: Brand: IOBAN™ 2

## (undated) DEVICE — TOWEL,OR,DSP,ST,BLUE,DLX,4/PK,20PK/CS: Brand: MEDLINE

## (undated) DEVICE — BITEBLOCK ENDO W/STRAP 60F A/ LF DISP

## (undated) DEVICE — GLV SURG SENSICARE ALOE LF PF SZ7.5 GRN

## (undated) DEVICE — STPLR SKIN VISISTAT WD 35CT

## (undated) DEVICE — GLV SURG TRIUMPH PF LTX 7 STRL

## (undated) DEVICE — MARKR SKIN W/RULR AND LBL

## (undated) DEVICE — SINGLE-USE BIOPSY FORCEPS: Brand: RADIAL JAW 4

## (undated) DEVICE — CVR SURG EQUIP BND RECTG 36X28